# Patient Record
Sex: MALE | Race: WHITE | NOT HISPANIC OR LATINO | Employment: FULL TIME | URBAN - METROPOLITAN AREA
[De-identification: names, ages, dates, MRNs, and addresses within clinical notes are randomized per-mention and may not be internally consistent; named-entity substitution may affect disease eponyms.]

---

## 2022-09-23 ENCOUNTER — APPOINTMENT (OUTPATIENT)
Dept: RADIOLOGY | Facility: CLINIC | Age: 52
End: 2022-09-23
Payer: COMMERCIAL

## 2022-09-23 VITALS
DIASTOLIC BLOOD PRESSURE: 75 MMHG | RESPIRATION RATE: 19 BRPM | HEART RATE: 78 BPM | SYSTOLIC BLOOD PRESSURE: 128 MMHG | WEIGHT: 226 LBS | BODY MASS INDEX: 29 KG/M2 | HEIGHT: 74 IN | TEMPERATURE: 98.2 F

## 2022-09-23 DIAGNOSIS — M25.511 RIGHT SHOULDER PAIN, UNSPECIFIED CHRONICITY: ICD-10-CM

## 2022-09-23 DIAGNOSIS — M75.51 ACUTE BURSITIS OF RIGHT SHOULDER: Primary | ICD-10-CM

## 2022-09-23 PROCEDURE — 73030 X-RAY EXAM OF SHOULDER: CPT

## 2022-09-23 PROCEDURE — 99204 OFFICE O/P NEW MOD 45 MIN: CPT | Performed by: ORTHOPAEDIC SURGERY

## 2022-09-23 NOTE — PROGRESS NOTES
Assessment/Plan:  1  Acute bursitis of right shoulder  XR shoulder 2+ vw right    Ambulatory referral to Physical Therapy       Jamilah Maradiaga has right-sided shoulder pain consistent with subacromial impingement, rotator cuff tendinitis and bursitis in the right shoulder  He has appropriate strength on examination and appropriate range of motion  I do not think he has a large rotator cuff tear on examination today  I recommended activity modification and formal physical therapy as well as oral anti-inflammatory medications and ice as needed  We also briefly discussed possibility of a cortisone injection however we will hold off on that at this time  He will undergo physical therapy over the next 4-6 weeks and follow up in the office after therapy is complete  Subjective:   Carlo Anderson is a 46 y o  male who presents to the office for evaluation for right-sided shoulder pain  He denies any specific injury or trauma but he has been having increased discomfort in the right shoulder for the last 3-4 weeks  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  He has felt increased pain and discomfort over the anterior portion of the right shoulder  He denies anyone traumatic injury or feeling a pop in his shoulder  He did play golf recently and seem to exacerbate his pain a little bit further with golfing  He feels an increased aching throbbing pain over the front of the shoulder that worsens with lifting especially with his arm straight out in front  He denies any numbness or tingling radiating pain down his arm  Review of Systems   Constitutional: Negative for chills, fever and unexpected weight change  HENT: Negative for hearing loss, nosebleeds and sore throat  Eyes: Negative for pain, redness and visual disturbance  Respiratory: Negative for cough, shortness of breath and wheezing  Cardiovascular: Negative for chest pain, palpitations and leg swelling  Gastrointestinal: Negative for abdominal pain, nausea and vomiting  Endocrine: Negative for polyphagia and polyuria  Genitourinary: Negative for dysuria and hematuria  Musculoskeletal:        See HPI   Skin: Negative for rash and wound  Neurological: Negative for dizziness, numbness and headaches  Psychiatric/Behavioral: Negative for decreased concentration and suicidal ideas  The patient is not nervous/anxious  History reviewed  No pertinent past medical history  History reviewed  No pertinent surgical history  Family History   Problem Relation Age of Onset    No Known Problems Mother     No Known Problems Father        Social History     Occupational History    Not on file   Tobacco Use    Smoking status: Never Smoker    Smokeless tobacco: Never Used   Substance and Sexual Activity    Alcohol use: Not on file    Drug use: Not Currently    Sexual activity: Yes       No current outpatient medications on file  No Known Allergies    Objective:  Vitals:    09/23/22 1500   BP: 128/75   Pulse: 78   Resp: 19   Temp: 98 2 °F (36 8 °C)       Right Shoulder Exam     Tenderness   Right shoulder tenderness location: Subacromial space  Range of Motion   Active abduction: normal   Passive abduction: normal   Extension: normal   External rotation: normal   Forward flexion: normal   Internal rotation 0 degrees: normal     Muscle Strength   Abduction: 5/5   Internal rotation: 5/5   External rotation: 5/5   Supraspinatus: 5/5   Subscapularis: 5/5   Biceps: 5/5     Tests   Kelly test: positive  Impingement: positive  Drop arm: negative    Other   Erythema: absent  Sensation: normal  Pulse: present            Physical Exam  Vitals and nursing note reviewed  Constitutional:       Appearance: He is well-developed  HENT:      Head: Normocephalic and atraumatic  Eyes:      General: No scleral icterus  Extraocular Movements: Extraocular movements intact        Conjunctiva/sclera: Conjunctivae normal    Cardiovascular:      Rate and Rhythm: Normal rate  Pulmonary:      Effort: Pulmonary effort is normal  No respiratory distress  Musculoskeletal:      Cervical back: Normal range of motion and neck supple  Comments: As noted in HPI   Skin:     General: Skin is warm and dry  Neurological:      Mental Status: He is alert and oriented to person, place, and time  Psychiatric:         Behavior: Behavior normal          I have personally reviewed pertinent films in PACS and my interpretation is as follows:  X-rays of the right shoulder demonstrate no evidence of acute fracture or significant degenerative change    This document was created using speech voice recognition software  Grammatical errors, random word insertions, pronoun errors, and incomplete sentences are an occasional consequence of this system due to software limitations, ambient noise, and hardware issues  Any formal questions or concerns about content, text, or information contained within the body of this dictation should be directly addressed to the provider for clarification

## 2023-11-09 ENCOUNTER — OFFICE VISIT (OUTPATIENT)
Dept: URGENT CARE | Facility: CLINIC | Age: 53
End: 2023-11-09
Payer: COMMERCIAL

## 2023-11-09 VITALS
DIASTOLIC BLOOD PRESSURE: 80 MMHG | SYSTOLIC BLOOD PRESSURE: 120 MMHG | WEIGHT: 221 LBS | RESPIRATION RATE: 18 BRPM | HEART RATE: 64 BPM | BODY MASS INDEX: 28.36 KG/M2 | TEMPERATURE: 98 F | HEIGHT: 74 IN | OXYGEN SATURATION: 98 %

## 2023-11-09 DIAGNOSIS — N36.8 URETHRAL IRRITATION: ICD-10-CM

## 2023-11-09 DIAGNOSIS — N34.2 URETHRITIS: Primary | ICD-10-CM

## 2023-11-09 DIAGNOSIS — R39.9 UTI SYMPTOMS: ICD-10-CM

## 2023-11-09 LAB
SL AMB  POCT GLUCOSE, UA: ABNORMAL
SL AMB LEUKOCYTE ESTERASE,UA: ABNORMAL
SL AMB POCT BILIRUBIN,UA: ABNORMAL
SL AMB POCT BLOOD,UA: ABNORMAL
SL AMB POCT CLARITY,UA: CLEAR
SL AMB POCT COLOR,UA: YELLOW
SL AMB POCT KETONES,UA: ABNORMAL
SL AMB POCT NITRITE,UA: ABNORMAL
SL AMB POCT PH,UA: 5
SL AMB POCT SPECIFIC GRAVITY,UA: 1.01
SL AMB POCT URINE PROTEIN: ABNORMAL
SL AMB POCT UROBILINOGEN: 0.2

## 2023-11-09 PROCEDURE — 99213 OFFICE O/P EST LOW 20 MIN: CPT | Performed by: STUDENT IN AN ORGANIZED HEALTH CARE EDUCATION/TRAINING PROGRAM

## 2023-11-09 PROCEDURE — 87086 URINE CULTURE/COLONY COUNT: CPT | Performed by: STUDENT IN AN ORGANIZED HEALTH CARE EDUCATION/TRAINING PROGRAM

## 2023-11-09 PROCEDURE — 81002 URINALYSIS NONAUTO W/O SCOPE: CPT | Performed by: STUDENT IN AN ORGANIZED HEALTH CARE EDUCATION/TRAINING PROGRAM

## 2023-11-09 RX ORDER — VITAMIN B COMPLEX
1 CAPSULE ORAL DAILY
COMMUNITY

## 2023-11-09 RX ORDER — MULTIVITAMIN
1 TABLET ORAL DAILY
COMMUNITY

## 2023-11-09 NOTE — PROGRESS NOTES
[facility]        NAME: Sabas Martinez is a 48 y.o. male  : 1970    MRN: [id number]  DATE: 2023  TIME: 5:46 PM    Assessment and Orders   Urethritis [N34.2]  1. Urethritis  Urine culture      2. UTI symptoms  POCT urine dip    Urine culture      3. Urethral irritation  Ambulatory Referral to Urology    Urine culture            Plan and Discussion      Patient describing signs of urethritis. UA positive only for protein. Will follow up with urine culture. Referred patient to urology. Discussed ED precautions including (but not limited to)  Difficultly breathing or shortness of breath  Chest pain  Acutely worsening symptoms. Risks and benefits discussed. Patient understands and agrees with the plan. Follow up with PCP. Chief Complaint     Chief Complaint   Patient presents with    Possible UTI     Pt here for  urinary concerns pt states  itchy  inside his penis,  on going x  2 weeks. No meds used. History of Present Illness       States that prostate cancer and colon cancer runs in his family, he is concerned about his prostate. Low sex drive and difficultly with erectile difficulties. For two weeks has been having itching at the urethra. No lesions or bleeding. Having itching when he is urinating. States that he is having itching throughout the urethra. No lesions or skin changes. No testicular pain. Patient states that he has "sticky ejaculate" since having a vasectomy. No fevers. No blood in the urine or semen.            Review of Systems   Review of Systems  As stated above     Current Medications       Current Outpatient Medications:     b complex vitamins capsule, Take 1 capsule by mouth daily, Disp: , Rfl:     Multiple Vitamin (multivitamin) tablet, Take 1 tablet by mouth daily, Disp: , Rfl:     Current Allergies     Allergies as of 2023    (No Known Allergies)            The following portions of the patient's history were reviewed and updated as appropriate: allergies, current medications, past family history, past medical history, past social history, past surgical history and problem list.     Past Medical History:   Diagnosis Date    Patient denies medical problems     Tinnitus of both ears        Past Surgical History:   Procedure Laterality Date    NO PAST SURGERIES         Family History   Problem Relation Age of Onset    Diabetes Mother     Cancer Father     Heart disease Father          Medications have been verified. Objective   /80   Pulse 64   Temp 98 °F (36.7 °C)   Resp 18   Ht 6' 2" (1.88 m)   Wt 100 kg (221 lb)   SpO2 98%   BMI 28.37 kg/m²   No LMP for male patient. Physical Exam     Physical Exam  Constitutional:       General: He is not in acute distress. Appearance: He is not ill-appearing. HENT:      Head: Normocephalic and atraumatic. Right Ear: External ear normal.      Left Ear: External ear normal.      Nose: Nose normal.   Cardiovascular:      Rate and Rhythm: Normal rate and regular rhythm. Pulmonary:      Effort: Pulmonary effort is normal. No respiratory distress. Abdominal:      Tenderness: There is no abdominal tenderness. There is no right CVA tenderness or left CVA tenderness. Skin:     General: Skin is warm and dry. Neurological:      General: No focal deficit present. Mental Status: He is alert and oriented to person, place, and time. Psychiatric:         Mood and Affect: Mood normal.         Behavior: Behavior normal.         Thought Content:  Thought content normal.         Judgment: Judgment normal.               Arik Tillman DO

## 2023-11-10 LAB — BACTERIA UR CULT: NORMAL

## 2024-12-31 ENCOUNTER — OFFICE VISIT (OUTPATIENT)
Dept: OBGYN CLINIC | Facility: CLINIC | Age: 54
End: 2024-12-31
Payer: COMMERCIAL

## 2024-12-31 ENCOUNTER — OFFICE VISIT (OUTPATIENT)
Dept: LAB | Facility: HOSPITAL | Age: 54
End: 2024-12-31
Attending: ORTHOPAEDIC SURGERY
Payer: COMMERCIAL

## 2024-12-31 ENCOUNTER — APPOINTMENT (OUTPATIENT)
Dept: RADIOLOGY | Facility: CLINIC | Age: 54
End: 2024-12-31
Payer: COMMERCIAL

## 2024-12-31 VITALS — WEIGHT: 210 LBS | HEIGHT: 74 IN | BODY MASS INDEX: 26.95 KG/M2

## 2024-12-31 DIAGNOSIS — S82.892A CLOSED FRACTURE OF LEFT ANKLE, INITIAL ENCOUNTER: ICD-10-CM

## 2024-12-31 DIAGNOSIS — M25.572 LEFT ANKLE PAIN, UNSPECIFIED CHRONICITY: ICD-10-CM

## 2024-12-31 DIAGNOSIS — M25.572 LEFT ANKLE PAIN, UNSPECIFIED CHRONICITY: Primary | ICD-10-CM

## 2024-12-31 LAB
ANION GAP SERPL CALCULATED.3IONS-SCNC: 5 MMOL/L (ref 4–13)
BASOPHILS # BLD AUTO: 0.08 THOUSANDS/ΜL (ref 0–0.1)
BASOPHILS NFR BLD AUTO: 1 % (ref 0–1)
BUN SERPL-MCNC: 18 MG/DL (ref 5–25)
CALCIUM SERPL-MCNC: 9.5 MG/DL (ref 8.4–10.2)
CHLORIDE SERPL-SCNC: 105 MMOL/L (ref 96–108)
CO2 SERPL-SCNC: 29 MMOL/L (ref 21–32)
CREAT SERPL-MCNC: 0.97 MG/DL (ref 0.6–1.3)
EOSINOPHIL # BLD AUTO: 0.22 THOUSAND/ΜL (ref 0–0.61)
EOSINOPHIL NFR BLD AUTO: 2 % (ref 0–6)
ERYTHROCYTE [DISTWIDTH] IN BLOOD BY AUTOMATED COUNT: 12.1 % (ref 11.6–15.1)
GFR SERPL CREATININE-BSD FRML MDRD: 88 ML/MIN/1.73SQ M
GLUCOSE SERPL-MCNC: 92 MG/DL (ref 65–140)
HCT VFR BLD AUTO: 43.7 % (ref 36.5–49.3)
HGB BLD-MCNC: 14.9 G/DL (ref 12–17)
IMM GRANULOCYTES # BLD AUTO: 0.03 THOUSAND/UL (ref 0–0.2)
IMM GRANULOCYTES NFR BLD AUTO: 0 % (ref 0–2)
LYMPHOCYTES # BLD AUTO: 2.24 THOUSANDS/ΜL (ref 0.6–4.47)
LYMPHOCYTES NFR BLD AUTO: 22 % (ref 14–44)
MCH RBC QN AUTO: 31.4 PG (ref 26.8–34.3)
MCHC RBC AUTO-ENTMCNC: 34.1 G/DL (ref 31.4–37.4)
MCV RBC AUTO: 92 FL (ref 82–98)
MONOCYTES # BLD AUTO: 0.63 THOUSAND/ΜL (ref 0.17–1.22)
MONOCYTES NFR BLD AUTO: 6 % (ref 4–12)
NEUTROPHILS # BLD AUTO: 7.09 THOUSANDS/ΜL (ref 1.85–7.62)
NEUTS SEG NFR BLD AUTO: 69 % (ref 43–75)
NRBC BLD AUTO-RTO: 0 /100 WBCS
PLATELET # BLD AUTO: 273 THOUSANDS/UL (ref 149–390)
PMV BLD AUTO: 9.8 FL (ref 8.9–12.7)
POTASSIUM SERPL-SCNC: 4 MMOL/L (ref 3.5–5.3)
RBC # BLD AUTO: 4.74 MILLION/UL (ref 3.88–5.62)
SODIUM SERPL-SCNC: 139 MMOL/L (ref 135–147)
WBC # BLD AUTO: 10.29 THOUSAND/UL (ref 4.31–10.16)

## 2024-12-31 PROCEDURE — 36415 COLL VENOUS BLD VENIPUNCTURE: CPT

## 2024-12-31 PROCEDURE — 80048 BASIC METABOLIC PNL TOTAL CA: CPT

## 2024-12-31 PROCEDURE — 85025 COMPLETE CBC W/AUTO DIFF WBC: CPT

## 2024-12-31 PROCEDURE — 73610 X-RAY EXAM OF ANKLE: CPT

## 2024-12-31 PROCEDURE — 99204 OFFICE O/P NEW MOD 45 MIN: CPT | Performed by: ORTHOPAEDIC SURGERY

## 2024-12-31 PROCEDURE — 93005 ELECTROCARDIOGRAM TRACING: CPT

## 2024-12-31 RX ORDER — CHLORHEXIDINE GLUCONATE ORAL RINSE 1.2 MG/ML
15 SOLUTION DENTAL ONCE
Status: CANCELLED | OUTPATIENT
Start: 2024-12-31 | End: 2024-12-31

## 2024-12-31 NOTE — LETTER
December 31, 2024     Patient: Levi Jin  YOB: 1970  Date of Visit: 12/31/2024      To Whom it May Concern:    Levi Jin is under my professional care. Levi was seen in my office on 12/31/2024. Levi may return to work at this time on limited duty. He may drive. 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 No working on his feet of any kind.     If you have any questions or concerns, please don't hesitate to call.         Sincerely,          Sanjay Jackson MD

## 2024-12-31 NOTE — PROGRESS NOTES
Ortho Sports Medicine New Patient Visit     Assesment:   54 y.o. male with left lateral malleolus ankle fracture    Plan:    Patient has a Partida B lateral mall fracture on x-ray.  Stress x-rays were performed which which showed widening of the medial clear space.  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  For this reason I did recommend surgical intervention to include open reduction internal fixation of the lateral malleolus ankle fracture and possible syndesmotic fixation if there is persistent instability after fixation.  We had a detailed discussion of the risks, benefits, and alternatives to this procedure. The risks include but are not limited to infection, bleeding, stiffness, loss of range of motion, blood clot, failure of surgery, fracture, risk of potential future arthritis, swelling, injury to surrounding structures/nerve/artery/vein, failure of medical implants or surgical instruments, retained hardware and/or foreign body, and continued pain/dysfunction/disability. We discussed the expected timeline for recovery including the timeline for return to work and sporting activities.  We discussed a period of nonweightbearing for 4 weeks and transition to weightbearing after that.  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  Clearance for full duty will be between 8 and 12 weeks after surgery depending on his progress after beginning weightbearing.  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  The patient expressed good understanding and elected to proceed. They will meet be scheduled at a mutually convenient time in the near future.     Patient placed in a cam boot today.  I recommended nonweightbearing at this time.  He will not start physical therapy until after follow-up with me.  He will follow-up with me 10 to 14 days after surgery.        Follow up:    No follow-ups on  file.        Chief Complaint   Patient presents with    Left Ankle - Pain     Skiing incident.       History of Present Illness:    The patient is a 54 y.o. male presenting several days after an acute injury to his left ankle while skiing.  He states he was going at a low speed and his skis got twisted and he had a twisting injury to the ankle.  He did have acute pain at that time.  He has been able to ambulate though with pain.  For this reason he delayed getting evaluated for a few days.  Unfortunately the pain is not improving and continues to have a lot of pain with walking so he presented today for evaluation.  Pain is localized to the lateral aspect of the ankle.  There is no overlying skin wounds or lesions.  He denies any numbness or tingling.  He has had issues with the knees in the past but never with this ankle.          Past Medical, Social and Family History:  Past Medical History:   Diagnosis Date    Patient denies medical problems     Tinnitus of both ears      Past Surgical History:   Procedure Laterality Date    NO PAST SURGERIES       No Known Allergies  Current Outpatient Medications on File Prior to Visit   Medication Sig Dispense Refill    Multiple Vitamin (multivitamin) tablet Take 1 tablet by mouth daily      b complex vitamins capsule Take 1 capsule by mouth daily (Patient not taking: Reported on 12/31/2024)       No current facility-administered medications on file prior to visit.     Social History     Socioeconomic History    Marital status: /Civil Union     Spouse name: Not on file    Number of children: Not on file    Years of education: Not on file    Highest education level: Not on file   Occupational History    Not on file   Tobacco Use    Smoking status: Never    Smokeless tobacco: Never   Vaping Use    Vaping status: Never Used   Substance and Sexual Activity    Alcohol use: Yes    Drug use: Never    Sexual activity: Yes   Other Topics Concern    Not on file   Social History  "Narrative    Not on file     Social Drivers of Health     Financial Resource Strain: Not on file   Food Insecurity: Not on file   Transportation Needs: Not on file   Physical Activity: Not on file   Stress: Not on file   Social Connections: Not on file   Intimate Partner Violence: Not on file   Housing Stability: Not on file         I have reviewed the past medical, surgical, social and family history, medications and allergies as documented in the EMR.    Review of systems: ROS is negative other than that noted in the HPI.  Constitutional: Negative for fatigue and fever.   HENT: Negative for sore throat.    Respiratory: Negative for shortness of breath.    Cardiovascular: Negative for chest pain.   Gastrointestinal: Negative for abdominal pain.   Endocrine: Negative for cold intolerance and heat intolerance.   Genitourinary: Negative for flank pain.   Musculoskeletal: Negative for back pain.   Skin: Negative for rash.   Allergic/Immunologic: Negative for immunocompromised state.   Neurological: Negative for dizziness.   Psychiatric/Behavioral: Negative for agitation.      Physical Exam:    Height 6' 2\" (1.88 m), weight 95.3 kg (210 lb).    General/Constitutional: NAD, well developed, well nourished  HENT: Normocephalic, atraumatic  CV: Intact distal pulses, regular rate  Resp: No respiratory distress or labored breathing  Abdomen: soft, nondistended   Lymphatic: No lymphadenopathy palpated  Neuro: Alert and Oriented x 3, no focal deficits  Psych: Normal mood, normal affect  Skin: Warm, dry, no rashes, no erythema      Ankle Exam:   No significant skin lesions or deformity  Does have well-maintained dorsiflexion and plantarflexion though with pain.  Limited inversion eversion  Tender over the lateral malleolus no medial tenderness    Neurovascularly intact distally    Knee Imaging    X-rays left ankle including external rotation stress view shows distal fibula fracture with medial clear space widening on the stress " view

## 2025-01-02 ENCOUNTER — ANESTHESIA EVENT (OUTPATIENT)
Dept: PERIOP | Facility: HOSPITAL | Age: 55
End: 2025-01-02
Payer: COMMERCIAL

## 2025-01-02 LAB
ATRIAL RATE: 61 BPM
P AXIS: 63 DEGREES
PR INTERVAL: 164 MS
QRS AXIS: 48 DEGREES
QRSD INTERVAL: 102 MS
QT INTERVAL: 406 MS
QTC INTERVAL: 409 MS
T WAVE AXIS: 51 DEGREES
VENTRICULAR RATE: 61 BPM

## 2025-01-02 PROCEDURE — 93010 ELECTROCARDIOGRAM REPORT: CPT | Performed by: INTERNAL MEDICINE

## 2025-01-03 ENCOUNTER — APPOINTMENT (OUTPATIENT)
Dept: RADIOLOGY | Facility: HOSPITAL | Age: 55
End: 2025-01-03
Payer: COMMERCIAL

## 2025-01-03 ENCOUNTER — HOSPITAL ENCOUNTER (OUTPATIENT)
Facility: HOSPITAL | Age: 55
Setting detail: OUTPATIENT SURGERY
Discharge: HOME | End: 2025-01-03
Attending: ORTHOPAEDIC SURGERY | Admitting: ORTHOPAEDIC SURGERY
Payer: COMMERCIAL

## 2025-01-03 ENCOUNTER — ANESTHESIA (OUTPATIENT)
Dept: PERIOP | Facility: HOSPITAL | Age: 55
End: 2025-01-03
Payer: COMMERCIAL

## 2025-01-03 VITALS
DIASTOLIC BLOOD PRESSURE: 62 MMHG | RESPIRATION RATE: 18 BRPM | OXYGEN SATURATION: 98 % | HEIGHT: 73 IN | WEIGHT: 219.36 LBS | TEMPERATURE: 97 F | BODY MASS INDEX: 29.07 KG/M2 | SYSTOLIC BLOOD PRESSURE: 118 MMHG | HEART RATE: 63 BPM

## 2025-01-03 DIAGNOSIS — Z47.89 AFTERCARE FOLLOWING SURGERY OF THE MUSCULOSKELETAL SYSTEM: ICD-10-CM

## 2025-01-03 DIAGNOSIS — Z87.81 STATUS POST ORIF OF FRACTURE OF ANKLE: ICD-10-CM

## 2025-01-03 DIAGNOSIS — S82.892A CLOSED FRACTURE OF LEFT ANKLE, INITIAL ENCOUNTER: Primary | ICD-10-CM

## 2025-01-03 DIAGNOSIS — S82.892A FRACTURE OF MALLEOLUS, LEFT ANKLE, CLOSED, INITIAL ENCOUNTER: ICD-10-CM

## 2025-01-03 DIAGNOSIS — Z98.890 STATUS POST ORIF OF FRACTURE OF ANKLE: ICD-10-CM

## 2025-01-03 PROCEDURE — C1713 ANCHOR/SCREW BN/BN,TIS/BN: HCPCS | Performed by: ORTHOPAEDIC SURGERY

## 2025-01-03 PROCEDURE — 27792 TREATMENT OF ANKLE FRACTURE: CPT

## 2025-01-03 PROCEDURE — 73600 X-RAY EXAM OF ANKLE: CPT

## 2025-01-03 PROCEDURE — 27792 TREATMENT OF ANKLE FRACTURE: CPT | Performed by: ORTHOPAEDIC SURGERY

## 2025-01-03 PROCEDURE — 77071 MNL APPL STRS JT RADIOGRAPHY: CPT | Performed by: ORTHOPAEDIC SURGERY

## 2025-01-03 PROCEDURE — NC001 PR NO CHARGE: Performed by: ORTHOPAEDIC SURGERY

## 2025-01-03 DEVICE — IMPLANTABLE DEVICE: Type: IMPLANTABLE DEVICE | Site: ANKLE | Status: FUNCTIONAL

## 2025-01-03 DEVICE — SCREW VAL 3 X 16MM KREULOCK: Type: IMPLANTABLE DEVICE | Site: ANKLE | Status: FUNCTIONAL

## 2025-01-03 DEVICE — SCREW CORT 3.5 X 16MM LOPRFL FLLY THRD: Type: IMPLANTABLE DEVICE | Site: ANKLE | Status: FUNCTIONAL

## 2025-01-03 DEVICE — SCREW VAL 3 X 14MM KREULOCK: Type: IMPLANTABLE DEVICE | Site: ANKLE | Status: FUNCTIONAL

## 2025-01-03 DEVICE — SCREW CORT 3.5 X 14MM LOPRFL FLLY THRD: Type: IMPLANTABLE DEVICE | Site: ANKLE | Status: FUNCTIONAL

## 2025-01-03 RX ORDER — MIDAZOLAM HYDROCHLORIDE 2 MG/2ML
INJECTION, SOLUTION INTRAMUSCULAR; INTRAVENOUS AS NEEDED
Status: DISCONTINUED | OUTPATIENT
Start: 2025-01-03 | End: 2025-01-03

## 2025-01-03 RX ORDER — SODIUM CHLORIDE, SODIUM LACTATE, POTASSIUM CHLORIDE, CALCIUM CHLORIDE 600; 310; 30; 20 MG/100ML; MG/100ML; MG/100ML; MG/100ML
INJECTION, SOLUTION INTRAVENOUS CONTINUOUS PRN
Status: DISCONTINUED | OUTPATIENT
Start: 2025-01-03 | End: 2025-01-03

## 2025-01-03 RX ORDER — FENTANYL CITRATE 50 UG/ML
INJECTION, SOLUTION INTRAMUSCULAR; INTRAVENOUS AS NEEDED
Status: DISCONTINUED | OUTPATIENT
Start: 2025-01-03 | End: 2025-01-03

## 2025-01-03 RX ORDER — METOCLOPRAMIDE HYDROCHLORIDE 5 MG/ML
INJECTION INTRAMUSCULAR; INTRAVENOUS AS NEEDED
Status: DISCONTINUED | OUTPATIENT
Start: 2025-01-03 | End: 2025-01-03

## 2025-01-03 RX ORDER — ACETAMINOPHEN 500 MG
1000 TABLET ORAL EVERY 8 HOURS
Qty: 60 TABLET | Refills: 0 | Status: SHIPPED | OUTPATIENT
Start: 2025-01-03

## 2025-01-03 RX ORDER — FENTANYL CITRATE/PF 50 MCG/ML
25 SYRINGE (ML) INJECTION
Status: DISCONTINUED | OUTPATIENT
Start: 2025-01-03 | End: 2025-01-03 | Stop reason: HOSPADM

## 2025-01-03 RX ORDER — OXYCODONE HYDROCHLORIDE 5 MG/1
5 TABLET ORAL EVERY 4 HOURS PRN
Status: DISCONTINUED | OUTPATIENT
Start: 2025-01-03 | End: 2025-01-03 | Stop reason: HOSPADM

## 2025-01-03 RX ORDER — OXYCODONE HYDROCHLORIDE 5 MG/1
5 TABLET ORAL EVERY 6 HOURS PRN
Qty: 10 TABLET | Refills: 0 | Status: SHIPPED | OUTPATIENT
Start: 2025-01-03

## 2025-01-03 RX ORDER — ONDANSETRON 2 MG/ML
INJECTION INTRAMUSCULAR; INTRAVENOUS AS NEEDED
Status: DISCONTINUED | OUTPATIENT
Start: 2025-01-03 | End: 2025-01-03

## 2025-01-03 RX ORDER — CEFAZOLIN SODIUM 2 G/50ML
2000 SOLUTION INTRAVENOUS ONCE
Status: DISCONTINUED | OUTPATIENT
Start: 2025-01-03 | End: 2025-01-03 | Stop reason: HOSPADM

## 2025-01-03 RX ORDER — MAGNESIUM HYDROXIDE 1200 MG/15ML
LIQUID ORAL AS NEEDED
Status: DISCONTINUED | OUTPATIENT
Start: 2025-01-03 | End: 2025-01-03 | Stop reason: HOSPADM

## 2025-01-03 RX ORDER — CHLORHEXIDINE GLUCONATE ORAL RINSE 1.2 MG/ML
15 SOLUTION DENTAL ONCE
Status: COMPLETED | OUTPATIENT
Start: 2025-01-03 | End: 2025-01-03

## 2025-01-03 RX ORDER — DEXAMETHASONE SODIUM PHOSPHATE 10 MG/ML
INJECTION, SOLUTION INTRAMUSCULAR; INTRAVENOUS AS NEEDED
Status: DISCONTINUED | OUTPATIENT
Start: 2025-01-03 | End: 2025-01-03

## 2025-01-03 RX ORDER — NAPROXEN 500 MG/1
500 TABLET ORAL 2 TIMES DAILY WITH MEALS
Qty: 20 TABLET | Refills: 0 | Status: SHIPPED | OUTPATIENT
Start: 2025-01-03

## 2025-01-03 RX ORDER — PROPOFOL 10 MG/ML
INJECTION, EMULSION INTRAVENOUS AS NEEDED
Status: DISCONTINUED | OUTPATIENT
Start: 2025-01-03 | End: 2025-01-03

## 2025-01-03 RX ORDER — VANCOMYCIN HYDROCHLORIDE 1 G/20ML
INJECTION, POWDER, LYOPHILIZED, FOR SOLUTION INTRAVENOUS AS NEEDED
Status: DISCONTINUED | OUTPATIENT
Start: 2025-01-03 | End: 2025-01-03 | Stop reason: HOSPADM

## 2025-01-03 RX ORDER — LIDOCAINE HYDROCHLORIDE 10 MG/ML
INJECTION, SOLUTION EPIDURAL; INFILTRATION; INTRACAUDAL; PERINEURAL AS NEEDED
Status: DISCONTINUED | OUTPATIENT
Start: 2025-01-03 | End: 2025-01-03

## 2025-01-03 RX ORDER — CEFAZOLIN SODIUM 2 G/50ML
SOLUTION INTRAVENOUS AS NEEDED
Status: DISCONTINUED | OUTPATIENT
Start: 2025-01-03 | End: 2025-01-03

## 2025-01-03 RX ORDER — BUPIVACAINE HYDROCHLORIDE 2.5 MG/ML
INJECTION, SOLUTION EPIDURAL; INFILTRATION; INTRACAUDAL AS NEEDED
Status: DISCONTINUED | OUTPATIENT
Start: 2025-01-03 | End: 2025-01-03 | Stop reason: HOSPADM

## 2025-01-03 RX ORDER — KETOROLAC TROMETHAMINE 30 MG/ML
INJECTION, SOLUTION INTRAMUSCULAR; INTRAVENOUS AS NEEDED
Status: DISCONTINUED | OUTPATIENT
Start: 2025-01-03 | End: 2025-01-03

## 2025-01-03 RX ORDER — ACETAMINOPHEN 325 MG/1
650 TABLET ORAL EVERY 6 HOURS PRN
Status: CANCELLED | OUTPATIENT
Start: 2025-01-03

## 2025-01-03 RX ORDER — ONDANSETRON 2 MG/ML
4 INJECTION INTRAMUSCULAR; INTRAVENOUS ONCE AS NEEDED
Status: DISCONTINUED | OUTPATIENT
Start: 2025-01-03 | End: 2025-01-03 | Stop reason: HOSPADM

## 2025-01-03 RX ORDER — ONDANSETRON 2 MG/ML
4 INJECTION INTRAMUSCULAR; INTRAVENOUS EVERY 6 HOURS PRN
Status: CANCELLED | OUTPATIENT
Start: 2025-01-03

## 2025-01-03 RX ORDER — IBUPROFEN 200 MG
400 TABLET ORAL EVERY 6 HOURS PRN
COMMUNITY

## 2025-01-03 RX ORDER — ASPIRIN 81 MG/1
81 TABLET, CHEWABLE ORAL 2 TIMES DAILY
Qty: 84 TABLET | Refills: 0 | Status: SHIPPED | OUTPATIENT
Start: 2025-01-03

## 2025-01-03 RX ORDER — ROPIVACAINE HYDROCHLORIDE 5 MG/ML
INJECTION, SOLUTION EPIDURAL; INFILTRATION; PERINEURAL
Status: COMPLETED | OUTPATIENT
Start: 2025-01-03 | End: 2025-01-03

## 2025-01-03 RX ADMIN — DEXAMETHASONE SODIUM PHOSPHATE 10 MG: 10 INJECTION, SOLUTION INTRAMUSCULAR; INTRAVENOUS at 13:46

## 2025-01-03 RX ADMIN — CHLORHEXIDINE GLUCONATE 15 ML: 1.2 RINSE ORAL at 12:48

## 2025-01-03 RX ADMIN — SODIUM CHLORIDE, SODIUM LACTATE, POTASSIUM CHLORIDE, AND CALCIUM CHLORIDE: .6; .31; .03; .02 INJECTION, SOLUTION INTRAVENOUS at 13:37

## 2025-01-03 RX ADMIN — METOCLOPRAMIDE 10 MG: 5 INJECTION, SOLUTION INTRAMUSCULAR; INTRAVENOUS at 13:37

## 2025-01-03 RX ADMIN — ONDANSETRON 4 MG: 2 INJECTION INTRAMUSCULAR; INTRAVENOUS at 13:37

## 2025-01-03 RX ADMIN — FENTANYL CITRATE 50 MCG: 50 INJECTION, SOLUTION INTRAMUSCULAR; INTRAVENOUS at 13:41

## 2025-01-03 RX ADMIN — FENTANYL CITRATE 50 MCG: 50 INJECTION, SOLUTION INTRAMUSCULAR; INTRAVENOUS at 14:04

## 2025-01-03 RX ADMIN — ROPIVACAINE HYDROCHLORIDE 30 ML: 5 INJECTION, SOLUTION EPIDURAL; INFILTRATION; PERINEURAL at 13:33

## 2025-01-03 RX ADMIN — CEFAZOLIN SODIUM 2000 MG: 2 SOLUTION INTRAVENOUS at 13:38

## 2025-01-03 RX ADMIN — MIDAZOLAM 2 MG: 1 INJECTION INTRAMUSCULAR; INTRAVENOUS at 13:37

## 2025-01-03 RX ADMIN — FENTANYL CITRATE 50 MCG: 50 INJECTION, SOLUTION INTRAMUSCULAR; INTRAVENOUS at 14:50

## 2025-01-03 RX ADMIN — FENTANYL CITRATE 50 MCG: 50 INJECTION, SOLUTION INTRAMUSCULAR; INTRAVENOUS at 14:02

## 2025-01-03 RX ADMIN — KETOROLAC TROMETHAMINE 30 MG: 30 INJECTION, SOLUTION INTRAMUSCULAR at 14:48

## 2025-01-03 RX ADMIN — LIDOCAINE HYDROCHLORIDE 50 MG: 10 INJECTION, SOLUTION EPIDURAL; INFILTRATION; INTRACAUDAL; PERINEURAL at 13:41

## 2025-01-03 RX ADMIN — PROPOFOL 200 MG: 10 INJECTION, EMULSION INTRAVENOUS at 13:40

## 2025-01-03 NOTE — H&P
H&P reviewed. After examining the patient I find no changes in the patients condition since the H&P had been written.    Vitals:    01/03/25 1244   BP: 130/65   Pulse: 63   Resp: 18   SpO2: 98%         Ortho Sports Medicine New Patient Visit     Assesment:   54 y.o. male with left lateral malleolus ankle fracture    Plan:    Patient has a Partida B lateral mall fracture on x-ray.  Stress x-rays were performed which which showed widening of the medial clear space.  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  For this reason I did recommend surgical intervention to include open reduction internal fixation of the lateral malleolus ankle fracture and possible syndesmotic fixation if there is persistent instability after fixation.  We had a detailed discussion of the risks, benefits, and alternatives to this procedure. The risks include but are not limited to infection, bleeding, stiffness, loss of range of motion, blood clot, failure of surgery, fracture, risk of potential future arthritis, swelling, injury to surrounding structures/nerve/artery/vein, failure of medical implants or surgical instruments, retained hardware and/or foreign body, and continued pain/dysfunction/disability. We discussed the expected timeline for recovery including the timeline for return to work and sporting activities.  We discussed a period of nonweightbearing for 4 weeks and transition to weightbearing after that.  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  Clearance for full duty will be between 8 and 12 weeks after surgery depending on his progress after beginning weightbearing.  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  The patient expressed good understanding and elected to proceed. They will meet be scheduled at a mutually convenient time in the near future.     Patient placed in a cam boot today.  I  recommended nonweightbearing at this time.  He will not start physical therapy until after follow-up with me.  He will follow-up with me 10 to 14 days after surgery.        Follow up:    No follow-ups on file.        No chief complaint on file.      History of Present Illness:    The patient is a 54 y.o. male presenting several days after an acute injury to his left ankle while skiing.  He states he was going at a low speed and his skis got twisted and he had a twisting injury to the ankle.  He did have acute pain at that time.  He has been able to ambulate though with pain.  For this reason he delayed getting evaluated for a few days.  Unfortunately the pain is not improving and continues to have a lot of pain with walking so he presented today for evaluation.  Pain is localized to the lateral aspect of the ankle.  There is no overlying skin wounds or lesions.  He denies any numbness or tingling.  He has had issues with the knees in the past but never with this ankle.          Past Medical, Social and Family History:  Past Medical History:   Diagnosis Date    Patient denies medical problems     Tinnitus of both ears      Past Surgical History:   Procedure Laterality Date    NO PAST SURGERIES       No Known Allergies  No current facility-administered medications on file prior to encounter.     Current Outpatient Medications on File Prior to Encounter   Medication Sig Dispense Refill    ibuprofen (MOTRIN) 200 mg tablet Take 400 mg by mouth every 6 (six) hours as needed for mild pain      Multiple Vitamin (multivitamin) tablet Take 1 tablet by mouth daily      [DISCONTINUED] b complex vitamins capsule Take 1 capsule by mouth daily (Patient not taking: Reported on 12/31/2024)       Social History     Socioeconomic History    Marital status: /Civil Union     Spouse name: Not on file    Number of children: Not on file    Years of education: Not on file    Highest education level: Not on file   Occupational History  "   Not on file   Tobacco Use    Smoking status: Never    Smokeless tobacco: Never   Vaping Use    Vaping status: Never Used   Substance and Sexual Activity    Alcohol use: Yes    Drug use: Never    Sexual activity: Yes   Other Topics Concern    Not on file   Social History Narrative    Not on file     Social Drivers of Health     Financial Resource Strain: Not on file   Food Insecurity: Not on file   Transportation Needs: Not on file   Physical Activity: Not on file   Stress: Not on file   Social Connections: Not on file   Intimate Partner Violence: Not on file   Housing Stability: Not on file         I have reviewed the past medical, surgical, social and family history, medications and allergies as documented in the EMR.    Review of systems: ROS is negative other than that noted in the HPI.  Constitutional: Negative for fatigue and fever.   HENT: Negative for sore throat.    Respiratory: Negative for shortness of breath.    Cardiovascular: Negative for chest pain.   Gastrointestinal: Negative for abdominal pain.   Endocrine: Negative for cold intolerance and heat intolerance.   Genitourinary: Negative for flank pain.   Musculoskeletal: Negative for back pain.   Skin: Negative for rash.   Allergic/Immunologic: Negative for immunocompromised state.   Neurological: Negative for dizziness.   Psychiatric/Behavioral: Negative for agitation.      Physical Exam:    Blood pressure 130/65, pulse 63, resp. rate 18, height 6' 1\" (1.854 m), weight 99.5 kg (219 lb 5.7 oz), SpO2 98%.    General/Constitutional: NAD, well developed, well nourished  HENT: Normocephalic, atraumatic  CV: Intact distal pulses, regular rate  Resp: No respiratory distress or labored breathing  Abdomen: soft, nondistended   Lymphatic: No lymphadenopathy palpated  Neuro: Alert and Oriented x 3, no focal deficits  Psych: Normal mood, normal affect  Skin: Warm, dry, no rashes, no erythema      Ankle Exam:   No significant skin lesions or deformity  Does " have well-maintained dorsiflexion and plantarflexion though with pain.  Limited inversion eversion  Tender over the lateral malleolus no medial tenderness    Neurovascularly intact distally    Knee Imaging    X-rays left ankle including external rotation stress view shows distal fibula fracture with medial clear space widening on the stress view

## 2025-01-03 NOTE — OP NOTE
OPERATIVE REPORT  PATIENT NAME: Levi Jin    :  1970  MRN: [id number]  Pt Location: [facility] OR ROOM 01    SURGERY DATE: 1/3/2025    Surgeons and Role:     * Sanjay Jackson MD - Primary     * Phylicia Franco PA-C - Assisting    The presence of Phylicia Franco PA-C was required for poisitioning, retraction, assistance, and closure. No qualified resident was available.    Preop Diagnosis:  Closed fracture of left ankle, initial encounter [S82.892A]    Post-Op Diagnosis Codes:     * Closed fracture of left ankle, initial encounter [S82.892A]    Procedure(s):  Left - Left lateral maleolus open reduction internal fixation    Specimen(s):  * No specimens in log *    Estimated Blood Loss:   Minimal    Drains:  * No LDAs found *    Implants:   Implant Name Type Inv. Item Serial No.  Lot No. LRB No. Used Action   LOG [id number] - SYNTHES 2.7, AND 3.5MM LCP DISTAL FIBULA PLATES - 1           LOG [id number] - SYNTHES 4.0MM CANNULATED SCREW SET - 1           LOG [id number] - SYNTHES LCP SMALL FRAGMENT INSTRUMENT SET - 1           PLATE FIBULA DSTL LCK 6HL LT - [id number]  PLATE FIBULA DSTL LCK 6HL LT  [employer]  Left 1 Implanted   SCREW JALEEL 3 X 20MM LOPRFL - [id number]  SCREW JALEEL 3 X 20MM LOPRFL  [employer]  Left 1 Implanted   SCREW JALEEL 3 X 28MM LOPRFL - [id number]  SCREW JALEEL 3 X 28MM LOPRFL  [employer]  Left 1 Implanted   SCREW ALESSANDRA 3 X 14MM KREULOCK - [id number]  SCREW ALESSANDRA 3 X 14MM KREULOCK  [employer]  Left 2 Implanted   SCREW JALEEL 3.5 X 14MM LOPRFL FLLY THRD - [id number]  SCREW JALEEL 3.5 X 14MM LOPRFL FLLY THRD  [employer]  Left 2 Implanted   SCREW JALEEL 3.5 X 16MM LOPRFL FLLY THRD - [id number]  SCREW JALEEL 3.5 X 16MM LOPRFL FLLY THRD  [employer]  Left 1 Implanted   ANCHOR SUT MALU-BB THRD - [id number]  ANCHOR SUT MALU-BB THRD  [employer]  Left 0 Implanted and Explanted   SCREW ALESSANDRA 3 X 16MM KREULOCK - [id number]  SCREW ALESSANDRA 3 X 16MM KREULOCK  [employer]  Left 1 Implanted         Anesthesia Type:   General  w/ Regional    Operative Indications:  54-year-old very active male who sustained a skiing injury.  He was found to have a lateral malleolus fracture.  Stress x-rays showed instability with external rotation stress view.  Given his high level of activity and an unstable ankle fracture I did recommend surgical intervention to include open reduction internal fixation of left lateral malleolus as well as possible syndesmotic fixation.  We discussed the need for syndesmotic fixation with depend on stability with stress x-ray after lateral malleolus fixation was performed.  We had a detailed discussion of the risks, benefits, and alternatives to this procedure. The risks include but are not limited to infection, bleeding, stiffness, loss of range of motion, blood clot, failure of surgery, fracture, risk of potential future arthritis, swelling, injury to surrounding structures/nerve/artery/vein, failure of medical implants or surgical instruments, retained hardware and/or foreign body, and continued pain/dysfunction/disability. We discussed the expected timeline for recovery including the timeline for return to work and sporting activities. The patient expressed good understanding and elected to proceed.  Written consent was signed.     Findings:  Lateral malleolus ankle fracture that was unstable with external rotation stress.  After open reduction internal fixation was performed external rotation stress view was repeated under fluoroscopy or this showed no evidence of medial clear space widening.    Procedure:  In the pre-operative holding area, the patient identified the correct operative extremity and I marked that extremity with my initials. The patient was then brought to the operating room and positioned supine. Following satisfactory induction of anesthesia, the left ankle was prepped and draped in the usual sterile fashion. Before any surgical instrumentation was passed to me by the surgical technician, a  formalized time-out occurred, which involves the surgeon, circulating nurse, and anesthesia staff all verifying the correct operative extremity. My initials were visible on the prepped and draped operative field.      After final timeout was performed and the limb was exsanguinated using an Esmarch bandage and the tourniquet was inflated to 250 mmHg.  A standard lateral approach to the ankle was performed.  The fracture site was identified.  I open the fracture site and debrided early callus.  I then used a reduction forceps and manipulation of the ankle to reduce the fracture appropriately.  Reduction was confirmed under fluoroscopy.  I then placed a 3-0 lag screw across the fracture site.  The reduction clamp was removed and the fracture was found to be stable afterwards.  Appropriate position and reduction were confirmed using fluoroscopy again.  I then placed a lateral plate and proximal cortical nonlocking screws and a combination of distal locking and nonlocking screws.  After the plate was placed I again confirmed appropriate position of screws and plate on both AP and lateral views using fluoroscopy.  I then performed an external rotation stress view under fluoroscopy and found the ankle to be stable with no evidence of medial clear space widening.    Tourniquet was deflated.  Hemostasis was confirmed using electrocautery.  The wound was irrigated with normal saline.  I placed vancomycin powder onto the plate.  The wound was then closed in layered fashion using 2-0 Vicryl followed by 2-0 nylon in horizontal mattress configuration.  A sterile dressing and boot was placed.    The patient emerged from anesthesia.  The procedure was well-tolerated with no apparent complications.  He was taken to the recovery room in stable condition.        SIGNATURE: Sanjay Jackson MD  DATE: January 3, 2025  TIME: 4:39 PM

## 2025-01-03 NOTE — DISCHARGE INSTR - AVS FIRST PAGE
Postoperative Instructions Following Knee Surgery    MEDICATIONS:  Resume all home medications unless otherwise instructed by your surgeon.  Pain Medication:   Take Tylenol and Naproxen on prescribed schedule for 7 days  Take Oxycodone as needed for severe pain   If you were given a regional anesthetic (nerve block), it is helpful to take your pain medication before the block wear off.    Possible side effects include nausea, constipation, and urinary retention.  If you experience these side effects, please call our office for assistance.  Pain med refills are authorized only during office hours (8am-4pm, Mon-Fri).  Blood Clot Prevention:   Ambulate with your crutches at least once every hour   Take Aspirin 81 mg twice daily for 6 weeks following surgery.    WOUND CARE:  Keep the dressing clean and dry.  Light drainage may occur the first 2 days postop.  You may remove the Cam boot for hygiene. Keep the dressings clean and dry in the shoulder. You can shower 48 hours after surgery. Replace the boot after you shower to be worn otherwise at all times. DO NOT immerse the incision under water.  Carefully pat the incision dry.  If there is wound drainage, re-apply a fresh dry gauze dressing.  Please call our office ([phone]) if you experience either of the following:  Sudden increase in swelling, redness, or warmth at the surgical site  Excessive incisional drainage that persists beyond the 3rd day after surgery  Oral temperature greater than 101 degrees, not relieved with Tylenol  Shortness of breath, chest pain, nausea, or any other concerning symptoms    Other pain/swelling control measures:  Cold Therapy:  Apply ice (20 min on, 20 min off) as often as you feel is necessary. Ice helps with pain.   Elevation:  Elevate the entire leg above heart level.  Place pillows under your ankle to keep your knee straight. This will help with swelling and prevents stiffness     RANGE OF MOTION:  You are NOT ALLOWED RANGE OF  "MOTION until you are given permission from your surgeon.    IMMOBILIZATION:  Wear the Cam boot at all times, including sleep. The boot can be removed only for hygiene, then replace the boot after showering. Keep the dressings clean and dry at all times.    ACTIVITY:   DO NOT BEAR WEIGHT on the operative leg.  Always use crutches.  Using Crutches on Stairs:  Going up, lead with your \"good\" (nonoperative) leg.  Going down, lead with your \"bad\" (operative) leg.  Use a hand rail when available.  Knee Extension:  Place a rolled towel or pillow under your ankle for 20-30 minutes 3-5 times per day.  This will help to maintain full knee extension.  Quad Sets:  Sit or lie with your knee straight.  Tighten your quadriceps (front thigh) muscle.  Hold for 3 seconds, then relax.  Repeat 20 times per hour while awake.    PHYSICAL THERAPY:  To begin later in your recovery per discussion with Dr. Jackson as necessary.      FOLLOW-UP APPOINTMENT:  10-14 days with:    Dr. Don Jackson MD    [facility] [address], [address]  [place] [address]    [facility] [address] [address]    Phone:   [phone]   "

## 2025-01-03 NOTE — PERIOPERATIVE NURSING NOTE
Reviewed patient's chart with Roxana Richards RN pre-block. OR staff states they will verify chart in pre-procedure.

## 2025-01-03 NOTE — ANESTHESIA PROCEDURE NOTES
Peripheral Block    Patient location during procedure: holding area  Reason for block: at surgeon's request and post-op pain management  Staffing  Performed by: Jorge Roberts DO  Authorized by: Jorge Roberts DO    Preanesthetic Checklist  Completed: patient identified, IV checked, site marked, risks and benefits discussed, surgical consent, monitors and equipment checked, pre-op evaluation and timeout performed  Peripheral Block  Patient position: supine  Prep: ChloraPrep  Patient monitoring: continuous pulse oximetry, frequent blood pressure checks and heart rate  Block type: Popliteal  Laterality: left  Injection technique: single-shot  Procedures: ultrasound guided, Ultrasound guidance required for the procedure to increase accuracy and safety of medication placement and decrease risk of complications.  ropivacaine (NAROPIN) 0.5 % injection 20 mL - Perineural   30 mL - 1/3/2025 1:33:00 PM  Needle  Needle type: Stimuplex   Needle gauge: 20 G  Needle length: 4 in  Needle localization: anatomical landmarks and ultrasound guidance  Assessment  Injection assessment: frequent aspiration, injected with ease, negative aspiration, no paresthesia on injection, no symptoms of intraneural/intravenous injection, negative for heart rate change, needle tip visualized at all times and incremental injection  Paresthesia pain: none  Post-procedure:  site cleaned  patient tolerated the procedure well with no immediate complications

## 2025-01-03 NOTE — ANESTHESIA POSTPROCEDURE EVALUATION
Post-Op Assessment Note    CV Status:  Stable  Pain Score: 0    Pain management: adequate       Mental Status:  Sleepy   Hydration Status:  Stable   PONV Controlled:  None   Airway Patency:  Patent     Post Op Vitals Reviewed: Yes    No anethesia notable event occurred.    Staff: CRNA           Last Filed PACU Vitals:  Vitals Value Taken Time   Temp 97    Pulse 73    /73    Resp 13    SpO2 100 on FM O2

## 2025-01-03 NOTE — PROGRESS NOTES
Patient alert and awake, dressing clean, dry and intact, vital signs WNL. Patient transferred to APU via stretcher. Bedside report given to STEVE Hernandez. Side rails up, call bell within reach.

## 2025-01-03 NOTE — PROGRESS NOTES
Reviewed discharge instructions with patient and family at bedside. Answered questions appropriately. Patient dressed with assistance and was transported downstairs via wheelchair.

## 2025-01-03 NOTE — PROGRESS NOTES
Received patient from PACU RN. Patient awake and alert sitting upright on stretcher. Vitals WNL, denies any pain at this time. Refreshments provided, family updated and at bedside. Stretcher low and locked with bilateral side rails up. Call bell within reach, will continue to monitor. Mary Walker

## 2025-01-04 NOTE — ANESTHESIA POSTPROCEDURE EVALUATION
Post-Op Assessment Note    CV Status:  Stable  Pain Score: 0    Pain management: adequate    Multimodal analgesia used between 6 hours prior to anesthesia start to PACU discharge    Mental Status:  Alert   Hydration Status:  Stable   PONV Controlled:  None   Airway Patency:  Patent     Post Op Vitals Reviewed: Yes    No anethesia notable event occurred.    Staff: Anesthesiologist           Last Filed PACU Vitals:  Vitals Value Taken Time   Temp 97 °F (36.1 °C) 01/03/25 1518   Pulse 66 01/03/25 1545   /74 01/03/25 1545   Resp 18 01/03/25 1545   SpO2 98 % 01/03/25 1545       Modified Lucia:     Vitals Value Taken Time   Activity 2 01/03/25 1530   Respiration 2 01/03/25 1530   Circulation 2 01/03/25 1530   Consciousness 2 01/03/25 1530   Oxygen Saturation 2 01/03/25 1530     Modified Lucia Score: 10

## 2025-01-06 ENCOUNTER — TELEPHONE (OUTPATIENT)
Age: 55
End: 2025-01-06

## 2025-01-06 NOTE — TELEPHONE ENCOUNTER
Caller: Patient     Doctor: Manuel    Reason for call: Patient stated right before his surgery on 1/3/25 he handed Dr. Jackson his [place] ppwk. He is calling to confirm the ppwk was received. I advised the patient to also upload the ppwk into his chart and gave him instructions.     Call back#: [phone]

## 2025-01-07 NOTE — TELEPHONE ENCOUNTER
Completed and s/w pt. He will come to the [facility] office today before 2pm to  copy.    Please print for patient.

## 2025-01-08 NOTE — TELEPHONE ENCOUNTER
Patient asking if [place] paperwork can be placed in MyChart.  Asking for call back when paperwork is placed.

## 2025-01-14 ENCOUNTER — OFFICE VISIT (OUTPATIENT)
Dept: OBGYN CLINIC | Facility: CLINIC | Age: 55
End: 2025-01-14

## 2025-01-14 ENCOUNTER — APPOINTMENT (OUTPATIENT)
Dept: RADIOLOGY | Facility: CLINIC | Age: 55
End: 2025-01-14
Payer: COMMERCIAL

## 2025-01-14 VITALS — WEIGHT: 219.36 LBS | BODY MASS INDEX: 29.07 KG/M2 | HEIGHT: 73 IN

## 2025-01-14 DIAGNOSIS — M25.572 LEFT ANKLE PAIN, UNSPECIFIED CHRONICITY: Primary | ICD-10-CM

## 2025-01-14 DIAGNOSIS — S82.892A CLOSED FRACTURE OF LEFT ANKLE, INITIAL ENCOUNTER: ICD-10-CM

## 2025-01-14 DIAGNOSIS — M25.572 LEFT ANKLE PAIN, UNSPECIFIED CHRONICITY: ICD-10-CM

## 2025-01-14 PROCEDURE — 73610 X-RAY EXAM OF ANKLE: CPT

## 2025-01-14 PROCEDURE — 99024 POSTOP FOLLOW-UP VISIT: CPT | Performed by: ORTHOPAEDIC SURGERY

## 2025-01-14 NOTE — PROGRESS NOTES
SUBJECTIVE:  Patient is a 54 y.o. year old male who presents for follow up now POD 11 s/p Left lateral maleolus open reduction internal fixation - Left performed on  1/3/2025.  Today, the patient notes he has been doing very well and notes only minimal discomfort in the ankle rated a 1/10. The patient has been compliant with wearing the Cam boot at all times, including sleep, and remaining non-weight bearing with crutches. The patient has been using Tylenol and Advil for pain and taking ASA 81 mg twice daily for blood clot prevention. Levi denies new injury/trauma or numbness/tingling.    PHYSICAL EXAMINATION:  General: well developed and well nourished, alert, oriented times 3, and appears comfortable  Psychiatric: Normal    MUSCULOSKELETAL EXAMINATION:    Left Lower Extremity   Incision: Clean, dry, and intact. Minimal clear drainage from a distal aspect of the incision without any erythema, tenderness, or warmth. Sutures removed and Steri strips replaced. ACE wrap applied  Motor intact to EHL/FHL. Able to gentle perform ankle DF/PF  +EHL/FHL/TA/GS  SILT throughout  Palpable DP pulse     Imaging  I reviewed and interpreted the left ankle x-rays today, which demonstrate appropriate positioning of hardware without any evidence of complications. Well-aligned mortise.       PLAN:    We reviewed the procedure in detail. I believe the patient is progressing appropriately and I am pleased with his clinical presentation today. We also reviewed his x-rays today, which do show appropriate positioning of hardware without complications. He will monitor for any additional drainage or signs of infection, which are not present today. Levi will remain non-weight bearing with crutches until 4 weeks post-op then progress to 50% weight bearing for another 2 weeks before gradually progressing to WBAT at 6 weeks post-op. I recommend he continue to wear the Cam boot for sleep for another 2 weeks. He can begin to remove the boot  while at rest to work on gentle ankle ROM as tolerated. Levi can resume normal hygiene, allowing water to run over the incision. He can reapply the ACE/boot afterwards. Steri strips can be removed in 5-7 days if still in place. Avoid submersion of the incision for 1 month after surgery. Continue using Tylenol/NSAIDs as needed for pain control. Take ASA 81 mg BID x6 weeks total following surgery for DVT prophylaxis. We will follow up with Levi in 4 weeks with repeat x-rays upon arrival.

## 2025-02-10 ENCOUNTER — OFFICE VISIT (OUTPATIENT)
Dept: OBGYN CLINIC | Facility: CLINIC | Age: 55
End: 2025-02-10

## 2025-02-10 ENCOUNTER — APPOINTMENT (OUTPATIENT)
Dept: RADIOLOGY | Facility: CLINIC | Age: 55
End: 2025-02-10
Payer: COMMERCIAL

## 2025-02-10 VITALS — BODY MASS INDEX: 28.49 KG/M2 | WEIGHT: 215 LBS | HEIGHT: 73 IN

## 2025-02-10 DIAGNOSIS — Z87.81 S/P ORIF (OPEN REDUCTION INTERNAL FIXATION) FRACTURE: ICD-10-CM

## 2025-02-10 DIAGNOSIS — Z98.890 S/P ORIF (OPEN REDUCTION INTERNAL FIXATION) FRACTURE: ICD-10-CM

## 2025-02-10 DIAGNOSIS — S82.892D CLOSED FRACTURE OF LEFT ANKLE WITH ROUTINE HEALING, SUBSEQUENT ENCOUNTER: Primary | ICD-10-CM

## 2025-02-10 DIAGNOSIS — S82.892A CLOSED FRACTURE OF LEFT ANKLE, INITIAL ENCOUNTER: ICD-10-CM

## 2025-02-10 DIAGNOSIS — Z48.89 AFTERCARE FOLLOWING SURGERY: ICD-10-CM

## 2025-02-10 PROCEDURE — 73610 X-RAY EXAM OF ANKLE: CPT

## 2025-02-10 PROCEDURE — 99024 POSTOP FOLLOW-UP VISIT: CPT | Performed by: ORTHOPAEDIC SURGERY

## 2025-02-10 NOTE — PROGRESS NOTES
SUBJECTIVE:  Patient is a 54 y.o. year old male who presents for follow up now 5 weeks s/p  Left lateral maleolus open reduction internal fixation - Left performed on  1/3/2025.  Today patient denies pain but notes discomfort to the lateral ankle. He has numbness to his lateral foot and toes. He has been partial weight bearing with CAM boot and crutch.        VITALS:  There were no vitals filed for this visit.    PHYSICAL EXAMINATION:  General: well developed and well nourished, alert, oriented times 3, and appears comfortable  Psychiatric: Normal    MUSCULOSKELETAL EXAMINATION:    Left Lower Extremity   Incision:  Clean, dry, and intact no signs of infection.   Minimal tenderness at the fracture site  Range of Motion: Can dorsiflex to 10 degrees, plantar flexes to 30 with no pain on the direction  Strength: not tested  +EHL/FHL/TA/GS  SILT throughout  Palpable DP pulse       Imaging  I reviewed and interpreted the left ankle x-rays today, which demonstrate appropriate positioning of hardware without any evidence of complications. Well-aligned mortise.     PLAN:    5 weeks S/P Left lateral maleolus open reduction internal fixation - Left performed on 1/3/2025. I am pleased with his clinical exam and review of imaging today. Patient can progress to WBAT.  Once he is bearing weight without significant pain in the cam boot, he can wean out of CAM walker boot over the next 1-2 weeks. Referral to physical therapy provided to normalize motion and progress back to proprioception and strengthening exercise as tolerated.    Follow up in 6 weeks. Repeat left ankle x-ray on arrival.      Scribe Attestation      I,:  Doris Vital am acting as a scribe while in the presence of the attending physician.:       I,:  Sanjay Jackson MD personally performed the services described in this documentation    as scribed in my presence.:

## 2025-02-19 ENCOUNTER — TELEPHONE (OUTPATIENT)
Age: 55
End: 2025-02-19

## 2025-02-19 ENCOUNTER — EVALUATION (OUTPATIENT)
Dept: PHYSICAL THERAPY | Facility: CLINIC | Age: 55
End: 2025-02-19
Payer: COMMERCIAL

## 2025-02-19 DIAGNOSIS — Z98.890 S/P ORIF (OPEN REDUCTION INTERNAL FIXATION) FRACTURE: ICD-10-CM

## 2025-02-19 DIAGNOSIS — Z87.81 S/P ORIF (OPEN REDUCTION INTERNAL FIXATION) FRACTURE: ICD-10-CM

## 2025-02-19 DIAGNOSIS — S82.892D CLOSED FRACTURE OF LEFT ANKLE WITH ROUTINE HEALING, SUBSEQUENT ENCOUNTER: ICD-10-CM

## 2025-02-19 DIAGNOSIS — Z48.89 AFTERCARE FOLLOWING SURGERY: ICD-10-CM

## 2025-02-19 PROCEDURE — 97162 PT EVAL MOD COMPLEX 30 MIN: CPT | Performed by: PHYSICAL THERAPIST

## 2025-02-19 PROCEDURE — 97110 THERAPEUTIC EXERCISES: CPT | Performed by: PHYSICAL THERAPIST

## 2025-02-19 NOTE — TELEPHONE ENCOUNTER
Caller: Patient    Doctor: Dr. Jackson    Reason for call: Patient called to schedule PT. Provided PT phone number.    Call back#: [phone]

## 2025-02-20 NOTE — PROGRESS NOTES
PT Evaluation     Today's date: 2025  Patient name: Levi Jin  : 1970  MRN: [id number]  Referring provider: Sanjay Jackson*  Dx:   Encounter Diagnosis     ICD-10-CM    1. Closed fracture of left ankle with routine healing, subsequent encounter  S82.892D Ambulatory Referral to Physical Therapy      2. S/P ORIF (open reduction internal fixation) fracture  Z98.890 Ambulatory Referral to Physical Therapy    Z87.81       3. Aftercare following surgery  Z48.89 Ambulatory Referral to Physical Therapy                     Assessment  Impairments: abnormal gait, abnormal or restricted ROM, abnormal movement, activity intolerance, impaired balance, impaired physical strength, lacks appropriate home exercise program, pain with function, weight-bearing intolerance, poor body mechanics and unable to perform ADL    Assessment details: Levi Jin is a 54 y.o. male  who presents status post L ankle ORIF of 6.5 weeks with the associated impairments including: pain, decreased strength, decreased ROM, decreased joint mobility, joint effusion, and ambulatory dysfunction.  He demonstrates significant limitations with dorsiflexion ROM along with plantar flexion ROM.  He demonstrates global L ankle strength deficits likely due in part to decreased ROM.  Functional recovery may be influenced by prolonged history of knee issues that contribute to difficulty with closed chain movements, though strength is good in those muscle groups.  He demonstrates good understanding of gait training to facilitate reduced compensation and efficient functional recovery without increasing pain.  He additionally has the following functional limitations including: restricted ADL's, prolonged standing, prolonged sitting, transfers, squatting, functional mobility, recreational activities, work-related activities, lifting/carrying, inability to golf, inability to work, and inability to skii. Patient's signs and symptoms are consistent  with that of the referring diagnosis.  Provided education regarding healing timelines and plan of care with emphasis on gradual reloading and weaning off CAM boot.      Patient would likley benefit from skilled physical therapy services to address their aforementioned functional limitations through a targeted program consisting of repeated ROM/flexibility exercises, graded strengthening to global ankle/LE muscles, static/dynamic balance exercises, and graded increase in functional activity training in order to progress towards prior level of function and independence with home exercise program.         Understanding of Dx/Px/POC: good     Prognosis: good    Goals  STG: to be achieved within 2-4 weeks  1. Pt will be able to ambulate community distances with normal heel to toe pattern.  2. Improve strength so that pt will be be able to perform sit to stand transfers without UE support.  3. Patient will be able to ascend steps without pain or limitation.  4. Pt will be I with HEP    LTG:  to be achieved within 4-8 weeks  1. Pt will be able to negotiate stairs reciprocally without hand rail assistance.  2. Improve SLS balance with EO of greater than 30s for facilitation of return to skiing.  3. Pt will be able to ambulate without time restrictions pain free.  4. Pt will return to squatting and lifting 40 pounds without pain or restriction.  5. Pt will return to skiing without limitation (6-8 months)      Plan  Patient would benefit from: PT eval and skilled physical therapy  Planned modality interventions: cryotherapy, TENS, thermotherapy: hydrocollator packs, electrical stimulation/Icelandic stimulation and manual electrical stimulation    Planned therapy interventions: manual therapy, neuromuscular re-education, self care, therapeutic activities, therapeutic exercise, home exercise program, joint mobilization, balance, gait training, flexibility, strengthening, stretching, patient education, ADL training, activity  modification, body mechanics training, group therapy, graded exercise, graded activity, functional ROM exercises and therapeutic training    Frequency: 2x week  Duration in weeks: 8  Treatment plan discussed with: patient  Plan details: HEP development, stretching, strengthening, A/AA/PROM, joint mobilizations, posture education, STM/MI as needed to reduce muscle tension, muscle reeducation, PLOC discussed and agreed upon with patient.    Subjective Evaluation    History of Present Illness  Date of surgery: 1/3/2025  Mechanism of injury: surgery  Mechanism of injury: Levi Jin is a 54 y.o. male who presents status post L ankle ORIF of 6.5 weeks after a skiing accident.  He reports he had been using a scooter to get around predominantly, but has gotten rid of that and has been WB in the CAM boot.  He reports numbness and the associated discomfort/tightness is his greatest pain/issue at this time.  He reports that prolonged positions in any mild stretch bring on these numbness symptoms of the lateral portion of the foot.  If he supports the foot, these symptoms go away.  He is typically very active including skiing across the country with his children.  He works as an  and with heavy construction and he is out of work at this time as there is only full duty.  He reports stairs are also an issue both due to his ankle and knee as he has dislocated his patella frequently in the past on both sides.          Not a recurrent problem   Quality of life: good    Patient Goals  Patient goals for therapy: increased strength, decreased pain, independence with ADLs/IADLs, return to sport/leisure activities, increased motion, improved balance and return to work  Patient goal: Return to skiing, golf, and all work activities without limitation.  Pain  Current pain ratin  At best pain ratin  At worst pain ratin  Quality: discomfort, tight, radiating and dull ache  Relieving factors: rest  Aggravating  factors: walking and stair climbing    Social Support  Steps to enter house: yes  Stairs in house: yes   Lives in: multiple-level home  Lives with: spouse    Employment status: working  Hand dominance: right  Exercise history: Skiing and heavy occupational work      Objective     Active Range of Motion   Left Ankle/Foot   Dorsiflexion (ke): 0 degrees   Plantar flexion: 25 degrees with pain  Inversion: 20 degrees with pain  Eversion: 8 degrees     Right Ankle/Foot   Dorsiflexion (ke): 20 degrees   Plantar flexion: 65 degrees   Inversion: 45 degrees   Eversion: 10 degrees     Strength/Myotome Testing     Left Ankle/Foot   Dorsiflexion: 3+  Plantar flexion: 3  Inversion: 3+  Eversion: 3+    Right Ankle/Foot   Dorsiflexion: 4+  Plantar flexion: 4+  Inversion: 4+  Eversion: 4+    Additional Strength Details  Ambulation: Decreased stance time on L LE with decreased dorsiflexion in stance phase and short step length.    Squat: Unable    Step Ups/Downs: Unable     SLS: Deferred due to healing timelines.           Precautions:   Past Medical History:   Diagnosis Date   • Patient denies medical problems    • Tinnitus of both ears         Insurance:  A/CMS Eval/ Re-eval Auth #/ Referral # Total units or visits Start date  Expiration date KX? Visit limitation?  PT only or  PT+OT? Co-Insurance   CMS 2/19/25 2/19/25                      POC Start Date POC Expiration Date Signed POC?   2/19/25 4/18/25       Date 2/19              Visits/Units:  Used 1              Authed:  Remaining                  [employer]:  Access Code: [email or web address]  URL: [email or web address]  Date: 02/19/2025  Prepared by: Garrett Lema    Exercises  - Long Sitting Ankle Eversion with Resistance  - 2 x daily - 7 x weekly - 3 sets - 10 reps  - Long Sitting Ankle Plantar Flexion with Resistance  - 2 x daily - 7 x weekly - 3 sets - 10 reps  - Long Sitting Ankle Inversion with Resistance  - 2 x daily - 7 x weekly - 3 sets - 10 reps  - Long Sitting  Ankle Dorsiflexion with Anchored Resistance  - 2 x daily - 7 x weekly - 3 sets - 10 reps  - Long Sitting Calf Stretch with Strap  - 2 x daily - 7 x weekly - 1 sets - 10 reps - 10 hold      Manuals   2/19   L Ankle PROM                        Neuro Re-Ed      Ankle 4-way   HEP                                       Ther Ex      Long Sit Strap Str   HEP   Assessment & Management   POC with emphasis on early ROM with gradual strengthening and stabilization training.                                       Ther Activity                  Gait Training                  Modalities

## 2025-02-24 ENCOUNTER — OFFICE VISIT (OUTPATIENT)
Dept: PHYSICAL THERAPY | Facility: CLINIC | Age: 55
End: 2025-02-24
Payer: COMMERCIAL

## 2025-02-24 DIAGNOSIS — Z98.890 S/P ORIF (OPEN REDUCTION INTERNAL FIXATION) FRACTURE: ICD-10-CM

## 2025-02-24 DIAGNOSIS — Z48.89 AFTERCARE FOLLOWING SURGERY: ICD-10-CM

## 2025-02-24 DIAGNOSIS — Z87.81 S/P ORIF (OPEN REDUCTION INTERNAL FIXATION) FRACTURE: ICD-10-CM

## 2025-02-24 DIAGNOSIS — S82.892D CLOSED FRACTURE OF LEFT ANKLE WITH ROUTINE HEALING, SUBSEQUENT ENCOUNTER: Primary | ICD-10-CM

## 2025-02-24 PROCEDURE — 97140 MANUAL THERAPY 1/> REGIONS: CPT | Performed by: PHYSICAL THERAPIST

## 2025-02-24 PROCEDURE — 97110 THERAPEUTIC EXERCISES: CPT | Performed by: PHYSICAL THERAPIST

## 2025-02-24 PROCEDURE — 97112 NEUROMUSCULAR REEDUCATION: CPT | Performed by: PHYSICAL THERAPIST

## 2025-02-24 NOTE — PROGRESS NOTES
Daily Note     Today's date: 2025  Patient name: Levi Jin  : 1970  MRN: [id number]  Referring provider: Sanjay Jackson*  Dx:   Encounter Diagnosis     ICD-10-CM    1. Closed fracture of left ankle with routine healing, subsequent encounter  S82.892D       2. S/P ORIF (open reduction internal fixation) fracture  Z98.890     Z87.81       3. Aftercare following surgery  Z48.89                      Subjective: Patient reports he feels better overall, he has been very intentional about walking.        Objective: See treatment diary below      Assessment: Tolerated treatment well. Patient demonstrates progress with dorsiflexion ROM which has improved gait.  Continued to emphasize heel toe walk with avoidance of out-toeing of the foot.  Patient demonstrated fatigue post treatment, exhibited good technique with therapeutic exercises, and would benefit from continued PT      Plan: Continue per plan of care.  Progress treatment as tolerated.  Progress challenge as appropriate with irritability.     Precautions:   Past Medical History:   Diagnosis Date    Patient denies medical problems     Tinnitus of both ears         Insurance:  [place]/CMS Eval/ Re-eval Auth #/ Referral # Total units or visits Start date  Expiration date KX? Visit limitation?  PT only or  PT+OT? Co-Insurance   CMS 25                      POC Start Date POC Expiration Date Signed POC?   25       Date              Visits/Units:  Used 1 2             Authed:  Remaining                  [employer]:  Access Code: [email or web address]  URL: [email or web address]  Date: 2025  Prepared by: Garrett Lema    Exercises  - Long Sitting Ankle Eversion with Resistance  - 2 x daily - 7 x weekly - 3 sets - 10 reps  - Long Sitting Ankle Plantar Flexion with Resistance  - 2 x daily - 7 x weekly - 3 sets - 10 reps  - Long Sitting Ankle Inversion with Resistance  - 2 x daily - 7 x weekly - 3 sets - 10 reps  -  "Long Sitting Ankle Dorsiflexion with Anchored Resistance  - 2 x daily - 7 x weekly - 3 sets - 10 reps  - Long Sitting Calf Stretch with Strap  - 2 x daily - 7 x weekly - 1 sets - 10 reps - 10 hold      Manuals  2/24 2/19   L Ankle PROM  BR performed                      Neuro Re-Ed      Ankle 4-way   HEP   Heel Raises  2x10 B/L    SLS  Airex 10x10\"                            Ther Ex      Long Sit Strap Str   HEP   Assessment & Management   POC with emphasis on early ROM with gradual strengthening and stabilization training.   Gastroc Str  10x10\" standing    Soleus Str  10x10\" standing      Leg Press   Calf Press 2x10 55# into stretch    Step Downs  10x                Ther Activity                  Gait Training                    Modalities                       "

## 2025-02-27 ENCOUNTER — OFFICE VISIT (OUTPATIENT)
Dept: PHYSICAL THERAPY | Facility: CLINIC | Age: 55
End: 2025-02-27
Payer: COMMERCIAL

## 2025-02-27 DIAGNOSIS — S82.892D CLOSED FRACTURE OF LEFT ANKLE WITH ROUTINE HEALING, SUBSEQUENT ENCOUNTER: Primary | ICD-10-CM

## 2025-02-27 DIAGNOSIS — Z87.81 S/P ORIF (OPEN REDUCTION INTERNAL FIXATION) FRACTURE: ICD-10-CM

## 2025-02-27 DIAGNOSIS — Z48.89 AFTERCARE FOLLOWING SURGERY: ICD-10-CM

## 2025-02-27 DIAGNOSIS — Z98.890 S/P ORIF (OPEN REDUCTION INTERNAL FIXATION) FRACTURE: ICD-10-CM

## 2025-02-27 PROCEDURE — 97110 THERAPEUTIC EXERCISES: CPT | Performed by: PHYSICAL THERAPIST

## 2025-02-27 PROCEDURE — 97140 MANUAL THERAPY 1/> REGIONS: CPT | Performed by: PHYSICAL THERAPIST

## 2025-02-27 PROCEDURE — 97112 NEUROMUSCULAR REEDUCATION: CPT | Performed by: PHYSICAL THERAPIST

## 2025-02-27 NOTE — PROGRESS NOTES
Daily Note     Today's date: 2025  Patient name: Levi Jin  : 1970  MRN: [id number]  Referring provider: Sanjay Jackson*  Dx:   Encounter Diagnosis     ICD-10-CM    1. Closed fracture of left ankle with routine healing, subsequent encounter  S82.892D       2. S/P ORIF (open reduction internal fixation) fracture  Z98.890     Z87.81       3. Aftercare following surgery  Z48.89                      Subjective: Patient reports he feels about the same overall.  His walking is better though his balance has continued to be difficult.      Objective: See treatment diary below      Assessment: Tolerated treatment well. Patient continues to progress with dorsiflexion ROM.  He demonstrates continued single limb balance deficits.  Patient demonstrated fatigue post treatment, exhibited good technique with therapeutic exercises, and would benefit from continued PT      Plan: Continue per plan of care.  Progress treatment as tolerated.  Progress challenge as appropriate with irritability.     Precautions:   Past Medical History:   Diagnosis Date    Patient denies medical problems     Tinnitus of both ears         Insurance:  [place]/CMS Eval/ Re-eval Auth #/ Referral # Total units or visits Start date  Expiration date KX? Visit limitation?  PT only or  PT+OT? Co-Insurance   CMS 25                      POC Start Date POC Expiration Date Signed POC?   25       Date             Visits/Units:  Used 1 2 3            Authed:  Remaining                  [employer]:  Access Code: [email or web address]  URL: [email or web address]  Date: 2025  Prepared by: Garrett Lema    Exercises  - Long Sitting Ankle Eversion with Resistance  - 2 x daily - 7 x weekly - 3 sets - 10 reps  - Long Sitting Ankle Plantar Flexion with Resistance  - 2 x daily - 7 x weekly - 3 sets - 10 reps  - Long Sitting Ankle Inversion with Resistance  - 2 x daily - 7 x weekly - 3 sets - 10 reps  - Long  "Sitting Ankle Dorsiflexion with Anchored Resistance  - 2 x daily - 7 x weekly - 3 sets - 10 reps  - Long Sitting Calf Stretch with Strap  - 2 x daily - 7 x weekly - 1 sets - 10 reps - 10 hold      Manuals 2/27 2/24 2/19   L Ankle PROM BR performed w/ effleurage  BR performed                      Neuro Re-Ed      Ankle 4-way   HEP   Heel Raises 2x10 2x10 B/L    SLS Airex 10x10\" Airex 10x10\"    SL RDL 2x10                       Ther Ex      Long Sit Strap Str   HEP   Assessment & Management   POC with emphasis on early ROM with gradual strengthening and stabilization training.   Gastroc Str  10x10\" standing    Soleus Str 5x10\" standing 10x10\" standing      Leg Press  Calf Press  2x10 70# 2x10 Calf Press 2x10 55# into stretch    Step Downs L6 2x10 over cone 10x                Ther Activity                  Gait Training                    Modalities                         "

## 2025-03-03 ENCOUNTER — OFFICE VISIT (OUTPATIENT)
Dept: PHYSICAL THERAPY | Facility: CLINIC | Age: 55
End: 2025-03-03
Payer: COMMERCIAL

## 2025-03-03 DIAGNOSIS — Z48.89 AFTERCARE FOLLOWING SURGERY: ICD-10-CM

## 2025-03-03 DIAGNOSIS — Z87.81 S/P ORIF (OPEN REDUCTION INTERNAL FIXATION) FRACTURE: ICD-10-CM

## 2025-03-03 DIAGNOSIS — Z98.890 S/P ORIF (OPEN REDUCTION INTERNAL FIXATION) FRACTURE: ICD-10-CM

## 2025-03-03 DIAGNOSIS — S82.892D CLOSED FRACTURE OF LEFT ANKLE WITH ROUTINE HEALING, SUBSEQUENT ENCOUNTER: Primary | ICD-10-CM

## 2025-03-03 PROCEDURE — 97112 NEUROMUSCULAR REEDUCATION: CPT | Performed by: PHYSICAL THERAPIST

## 2025-03-03 PROCEDURE — 97110 THERAPEUTIC EXERCISES: CPT | Performed by: PHYSICAL THERAPIST

## 2025-03-03 NOTE — PROGRESS NOTES
Daily Note     Today's date: 3/3/2025  Patient name: Levi Jin  : 1970  MRN: [id number]  Referring provider: Sanjay Jackson*  Dx:   Encounter Diagnosis     ICD-10-CM    1. Closed fracture of left ankle with routine healing, subsequent encounter  S82.892D       2. S/P ORIF (open reduction internal fixation) fracture  Z98.890     Z87.81       3. Aftercare following surgery  Z48.89                      Subjective: Patient reports he has been feeling better and his swelling is going down.      Objective: See treatment diary below      Assessment: Tolerated treatment well. Patient continues to progress with plantar flexion strength.  Remaining limitation with dorsiflexion is apparent with increased gait speed translating to feeling of foot slap.  Patient demonstrated fatigue post treatment, exhibited good technique with therapeutic exercises, and would benefit from continued PT      Plan: Continue per plan of care.  Progress treatment as tolerated.  Progress challenge as appropriate with irritability.     Precautions:   Past Medical History:   Diagnosis Date    Patient denies medical problems     Tinnitus of both ears         Insurance:  [place]/CMS Eval/ Re-eval Auth #/ Referral # Total units or visits Start date  Expiration date KX? Visit limitation?  PT only or  PT+OT? Co-Insurance   CMS 25                      POC Start Date POC Expiration Date Signed POC?   25       Date 2/19 2/24 2/27 3/3           Visits/Units:  Used 1 2 3 4           Authed:  Remaining                  [employer]:  Access Code: [email or web address]  URL: [email or web address]  Date: 2025  Prepared by: Garrett Lema    Exercises  - Long Sitting Ankle Eversion with Resistance  - 2 x daily - 7 x weekly - 3 sets - 10 reps  - Long Sitting Ankle Plantar Flexion with Resistance  - 2 x daily - 7 x weekly - 3 sets - 10 reps  - Long Sitting Ankle Inversion with Resistance  - 2 x daily - 7 x weekly - 3 sets  "- 10 reps  - Long Sitting Ankle Dorsiflexion with Anchored Resistance  - 2 x daily - 7 x weekly - 3 sets - 10 reps  - Long Sitting Calf Stretch with Strap  - 2 x daily - 7 x weekly - 1 sets - 10 reps - 10 hold      Manuals 3/3 2/27 2/24 2/19   L Ankle PROM  BR performed w/ effleurage  BR performed                         Neuro Re-Ed       Ankle 4-way    HEP   Heel Raises 2x10 on slantboard 2x10 2x10 B/L    SLS Airex 2x10 multi-directional movements Airex 10x10\" Airex 10x10\"    SL RDL  2x10     Sliders Anterior slide 2x10      Lat Step Downs L6 2x10             Ther Ex       Long Sit Strap Str    HEP   Assessment & Management    POC with emphasis on early ROM with gradual strengthening and stabilization training.   Gastroc Str   10x10\" standing    Soleus Str  5x10\" standing 10x10\" standing      Leg Press  Calf Press 3x10 80# Calf Press  2x10 70# 2x10 Calf Press 2x10 55# into stretch    Step Downs L6 overs 2x10 L6 2x10 over cone 10x                  Ther Activity                     Gait Training                       Modalities                              "

## 2025-03-06 ENCOUNTER — OFFICE VISIT (OUTPATIENT)
Dept: PHYSICAL THERAPY | Facility: CLINIC | Age: 55
End: 2025-03-06
Payer: COMMERCIAL

## 2025-03-06 DIAGNOSIS — Z87.81 S/P ORIF (OPEN REDUCTION INTERNAL FIXATION) FRACTURE: ICD-10-CM

## 2025-03-06 DIAGNOSIS — Z98.890 S/P ORIF (OPEN REDUCTION INTERNAL FIXATION) FRACTURE: ICD-10-CM

## 2025-03-06 DIAGNOSIS — Z48.89 AFTERCARE FOLLOWING SURGERY: ICD-10-CM

## 2025-03-06 DIAGNOSIS — S82.892D CLOSED FRACTURE OF LEFT ANKLE WITH ROUTINE HEALING, SUBSEQUENT ENCOUNTER: Primary | ICD-10-CM

## 2025-03-06 PROCEDURE — 97112 NEUROMUSCULAR REEDUCATION: CPT | Performed by: PHYSICAL THERAPIST

## 2025-03-06 PROCEDURE — 97110 THERAPEUTIC EXERCISES: CPT | Performed by: PHYSICAL THERAPIST

## 2025-03-06 NOTE — PROGRESS NOTES
Daily Note     Today's date: 3/6/2025  Patient name: Levi Jin  : 1970  MRN: [id number]  Referring provider: Sanjay Jackson*  Dx:   Encounter Diagnosis     ICD-10-CM    1. Closed fracture of left ankle with routine healing, subsequent encounter  S82.892D       2. S/P ORIF (open reduction internal fixation) fracture  Z98.890     Z87.81       3. Aftercare following surgery  Z48.89                      Subjective: Patient reports he continues to feel a block in the front of the ankle.      Objective: See treatment diary below      Assessment: Tolerated treatment fair. Patient continues to demonstrate tightness with dorsiflexion, though overall ROM has improved.  Educated to continue frequent repetitions of dorsiflexion to improve both soleus and gastroc flexibility.  Patient demonstrated fatigue post treatment, exhibited good technique with therapeutic exercises, and would benefit from continued PT      Plan: Continue per plan of care.  Progress treatment as tolerated.  Progress challenge as appropriate with irritability.     Precautions:   Past Medical History:   Diagnosis Date    Patient denies medical problems     Tinnitus of both ears         Insurance:  [place]/CMS Eval/ Re-eval Auth #/ Referral # Total units or visits Start date  Expiration date KX? Visit limitation?  PT only or  PT+OT? Co-Insurance   CMS 25                      POC Start Date POC Expiration Date Signed POC?   25       Date 2/19 2/24 2/27 3/3 3/6          Visits/Units:  Used 1 2 3 4 5          Authed:  Remaining                  [employer]:  Access Code: [email or web address]  URL: [email or web address]  Date: 2025  Prepared by: Garrett Lema    Exercises  - Long Sitting Ankle Eversion with Resistance  - 2 x daily - 7 x weekly - 3 sets - 10 reps  - Long Sitting Ankle Plantar Flexion with Resistance  - 2 x daily - 7 x weekly - 3 sets - 10 reps  - Long Sitting Ankle Inversion with Resistance  - 2 x  "daily - 7 x weekly - 3 sets - 10 reps  - Long Sitting Ankle Dorsiflexion with Anchored Resistance  - 2 x daily - 7 x weekly - 3 sets - 10 reps  - Long Sitting Calf Stretch with Strap  - 2 x daily - 7 x weekly - 1 sets - 10 reps - 10 hold      Manuals 3/6 3/3 2/27 2/24 2/19   L Ankle PROM   BR performed w/ effleurage  BR performed                            Neuro Re-Ed        Ankle 4-way     HEP   Heel Raises 2x10 on slantboard 2x10 on slantboard 2x10 2x10 B/L    SLS Star excursion 2x10 Airex 2x10 multi-directional movements Airex 10x10\" Airex 10x10\"    SL RDL 2x10  2x10     Sliders  Anterior slide 2x10      Lat Step Downs  L6 2x10              Ther Ex        Long Sit Strap Str     HEP   Assessment & Management POC with emphasis on frequent mobility exercise and standing exercises.    POC with emphasis on early ROM with gradual strengthening and stabilization training.   Gastroc Str    10x10\" standing    Soleus Str Slantboard 10x10\"  5x10\" standing 10x10\" standing      Leg Press  Calf Press 3x10 85# Calf Press 3x10 80# Calf Press  2x10 70# 2x10 Calf Press 2x10 55# into stretch    Step Downs  L6 overs 2x10 L6 2x10 over cone 10x    Lunge PF 2x10               Ther Activity                        Gait Training                          Modalities                                   "

## 2025-03-10 ENCOUNTER — OFFICE VISIT (OUTPATIENT)
Dept: PHYSICAL THERAPY | Facility: CLINIC | Age: 55
End: 2025-03-10
Payer: COMMERCIAL

## 2025-03-10 DIAGNOSIS — Z87.81 S/P ORIF (OPEN REDUCTION INTERNAL FIXATION) FRACTURE: ICD-10-CM

## 2025-03-10 DIAGNOSIS — S82.892D CLOSED FRACTURE OF LEFT ANKLE WITH ROUTINE HEALING, SUBSEQUENT ENCOUNTER: Primary | ICD-10-CM

## 2025-03-10 DIAGNOSIS — Z48.89 AFTERCARE FOLLOWING SURGERY: ICD-10-CM

## 2025-03-10 DIAGNOSIS — Z98.890 S/P ORIF (OPEN REDUCTION INTERNAL FIXATION) FRACTURE: ICD-10-CM

## 2025-03-10 PROCEDURE — 97110 THERAPEUTIC EXERCISES: CPT | Performed by: PHYSICAL THERAPIST

## 2025-03-10 PROCEDURE — 97140 MANUAL THERAPY 1/> REGIONS: CPT | Performed by: PHYSICAL THERAPIST

## 2025-03-10 PROCEDURE — 97112 NEUROMUSCULAR REEDUCATION: CPT | Performed by: PHYSICAL THERAPIST

## 2025-03-10 NOTE — PROGRESS NOTES
Daily Note     Today's date: 3/10/2025  Patient name: Levi Jin  : 1970  MRN: [id number]  Referring provider: Sanjay Jackson*  Dx:   Encounter Diagnosis     ICD-10-CM    1. Closed fracture of left ankle with routine healing, subsequent encounter  S82.892D       2. S/P ORIF (open reduction internal fixation) fracture  Z98.890     Z87.81       3. Aftercare following surgery  Z48.89                      Subjective: Patient reports she no longer feels pain when he walks, though he continues to feel that block in his ankle when bending his knee forward.     Objective: See treatment diary below      Assessment: Tolerated treatment well. Patient tolerated joint mobilization well, improved dorsiflexion ROM and decreased reported sense of obstruction with loaded dorsiflexion with knee flexed.  Patient demonstrated fatigue post treatment, exhibited good technique with therapeutic exercises, and would benefit from continued PT      Plan: Continue per plan of care.  Progress treatment as tolerated.  Progress challenge as appropriate with irritability.     Precautions:   Past Medical History:   Diagnosis Date    Patient denies medical problems     Tinnitus of both ears         Insurance:  A/CMS Eval/ Re-eval Auth #/ Referral # Total units or visits Start date  Expiration date KX? Visit limitation?  PT only or  PT+OT? Co-Insurance   CMS 25                      POC Start Date POC Expiration Date Signed POC?   25       Date 2/19 2/24 2/27 3/3 3/6 3/10         Visits/Units:  Used 1 2 3 4 5 6         Authed:  [facility]                  [employer]:  Access Code: [email or web address]  URL: [email or web address]  Date: 2025  Prepared by: Garrett Lema    Exercises  - Long Sitting Ankle Eversion with Resistance  - 2 x daily - 7 x weekly - 3 sets - 10 reps  - Long Sitting Ankle Plantar Flexion with Resistance  - 2 x daily - 7 x weekly - 3 sets - 10 reps  - Long Sitting Ankle  "Inversion with Resistance  - 2 x daily - 7 x weekly - 3 sets - 10 reps  - Long Sitting Ankle Dorsiflexion with Anchored Resistance  - 2 x daily - 7 x weekly - 3 sets - 10 reps  - Long Sitting Calf Stretch with Strap  - 2 x daily - 7 x weekly - 1 sets - 10 reps - 10 hold      Manuals 3/10 3/6 3/3 2/27 2/24 2/19   L Ankle PROM BR performed all directions   BR performed w/ effleurage  BR performed                               Neuro Re-Ed         Ankle 4-way      HEP   Heel Raises 2x10 on slantboard 2x 2x10 on slantboard 2x10 on slantboard 2x10 2x10 B/L    SLS  Star excursion 2x10 Airex 2x10 multi-directional movements Airex 10x10\" Airex 10x10\"    SL RDL  2x10  2x10     Sliders   Anterior slide 2x10      Lat Step Downs   L6 2x10      Dorsiflexion for Repetitions 2x10        Ther Ex         Long Sit Strap Str      HEP   Assessment & Management POC with emphasis on continued emphasis on repeated soleus stretch POC with emphasis on frequent mobility exercise and standing exercises.    POC with emphasis on early ROM with gradual strengthening and stabilization training.   Gastroc Str     10x10\" standing    Soleus Str Slantboard 10x10\" Slantboard 10x10\"  5x10\" standing 10x10\" standing      Leg Press   Calf Press 3x10 85# Calf Press 3x10 80# Calf Press  2x10 70# 2x10 Calf Press 2x10 55# into stretch    Step Downs   L6 overs 2x10 L6 2x10 over cone 10x    Lunge PF 3x10 w/ PT manual OP for dorsiflexion light pressure 2x10                Ther Activity                           Gait Training                             Modalities                                        "

## 2025-03-13 ENCOUNTER — OFFICE VISIT (OUTPATIENT)
Dept: PHYSICAL THERAPY | Facility: CLINIC | Age: 55
End: 2025-03-13
Payer: COMMERCIAL

## 2025-03-13 DIAGNOSIS — S82.892D CLOSED FRACTURE OF LEFT ANKLE WITH ROUTINE HEALING, SUBSEQUENT ENCOUNTER: Primary | ICD-10-CM

## 2025-03-13 DIAGNOSIS — Z87.81 S/P ORIF (OPEN REDUCTION INTERNAL FIXATION) FRACTURE: ICD-10-CM

## 2025-03-13 DIAGNOSIS — Z98.890 S/P ORIF (OPEN REDUCTION INTERNAL FIXATION) FRACTURE: ICD-10-CM

## 2025-03-13 DIAGNOSIS — Z48.89 AFTERCARE FOLLOWING SURGERY: ICD-10-CM

## 2025-03-13 PROCEDURE — 97110 THERAPEUTIC EXERCISES: CPT | Performed by: PHYSICAL THERAPIST

## 2025-03-13 PROCEDURE — 97140 MANUAL THERAPY 1/> REGIONS: CPT | Performed by: PHYSICAL THERAPIST

## 2025-03-13 NOTE — PROGRESS NOTES
"PT Re-Evaluation     Today's date: 3/13/2025  Patient name: Levi Jin  : 1970  MRN: [id number]  Referring provider: Sanjay Jackson*  Dx:   Encounter Diagnosis     ICD-10-CM    1. Closed fracture of left ankle with routine healing, subsequent encounter  S82.892D       2. Aftercare following surgery  Z48.89       3. S/P ORIF (open reduction internal fixation) fracture  Z98.890     Z87.81                      Assessment  Impairments: abnormal gait, abnormal or restricted ROM, abnormal movement, activity intolerance, impaired balance, impaired physical strength, lacks appropriate home exercise program, pain with function, weight-bearing intolerance, poor body mechanics and unable to perform ADL     Assessment details: Levi Jin is a 54 y.o. male  who presents status post L ankle ORIF of 10 weeks with the associated impairments including: pain, decreased strength, decreased ROM, decreased joint mobility, joint effusion, and ambulatory dysfunction.  He demonstrates good progress with plantar flexion strength.  He demonstrates good progress with dorsiflexion and plantar flexion ROM.  He continues to have deficits with dorsiflexion in closed chain.  Patient is able to relieve the \"block\" symptom with mild overpressure on the talus into dorsiflexion.  He demonstrates significant improvements in gait sequence with decreased trunk lean.  He continues to have the following functional limitations including: restricted ADL's, prolonged standing, prolonged sitting, transfers, squatting, functional mobility, recreational activities, work-related activities, lifting/carrying, inability to golf, inability to work, and inability to skii. Patient's signs and symptoms are consistent with that of the referring diagnosis.       Patient would likely benefit from continued skilled physical therapy services to address their aforementioned functional limitations through a targeted program consisting of repeated " ROM/flexibility exercises, graded strengthening to global ankle/LE muscles, static/dynamic balance exercises, and graded increase in functional activity training in order to progress towards prior level of function and independence with home exercise program.          Understanding of Dx/Px/POC: good     Prognosis: good     Goals  STG: to be achieved within 2-4 weeks  1. Pt will be able to ambulate community distances with normal heel to toe pattern.-met  2. Improve strength so that pt will be be able to perform sit to stand transfers without UE support.-met  3. Patient will be able to ascend steps without pain or limitation.-progressing towards  4. Pt will be I with HEP-progressing towards     LTG:  to be achieved within 4-8 weeks  1. Pt will be able to negotiate stairs reciprocally without hand rail assistance.-progressing towards  2. Improve SLS balance with EO of greater than 30s for facilitation of return to skiing.-progressing towards  3. Pt will be able to ambulate without time restrictions pain free.-progressing towards  4. Pt will return to squatting and lifting 40 pounds without pain or restriction.-progressing towards  5. Pt will return to skiing without limitation (6-8 months)-progressing towards        Plan  Patient would benefit from: PT eval and skilled physical therapy  Planned modality interventions: cryotherapy, TENS, thermotherapy: hydrocollator packs, electrical stimulation/Fijian stimulation and manual electrical stimulation     Planned therapy interventions: manual therapy, neuromuscular re-education, self care, therapeutic activities, therapeutic exercise, home exercise program, joint mobilization, balance, gait training, flexibility, strengthening, stretching, patient education, ADL training, activity modification, body mechanics training, group therapy, graded exercise, graded activity, functional ROM exercises and therapeutic training     Frequency: 2x week  Duration in weeks: 8  Treatment  plan discussed with: patient  Plan details: HEP development, stretching, strengthening, A/AA/PROM, joint mobilizations, posture education, STM/MI as needed to reduce muscle tension, muscle reeducation, PLOC discussed and agreed upon with patient.     Subjective Evaluation     History of Present Illness  Date of surgery: 1/3/2025  Mechanism of injury: surgery  Mechanism of injury: Levi Jin is a 54 y.o. male who presents status post L ankle ORIF of 10 weeks after a skiing accident.  Patient reports he feels less pain and limitation with walking, though descending steps continues to be a significant challenge during which he feels he has to drop for the last couple inches.  Additionally, he feels a block in the front of his ankle when stretching his calf.  His knees also contribute to difficulty descending the steps.          Not a recurrent problem   Quality of life: good     Patient Goals  Patient goals for therapy: increased strength, decreased pain, independence with ADLs/IADLs, return to sport/leisure activities, increased motion, improved balance and return to work  Patient goal: Return to skiing, golf, and all work activities without limitation.  Pain  Current pain ratin  At best pain ratin  At worst pain rating: 3  Quality: discomfort, tight, radiating and dull ache  Relieving factors: rest  Aggravating factors: walking and stair climbing     Social Support  Steps to enter house: yes  Stairs in house: yes   Lives in: multiple-level home  Lives with: spouse     Employment status: working  Hand dominance: right  Exercise history: Skiing and heavy occupational work        Objective      Active Range of Motion   Left Ankle/Foot   Dorsiflexion (ke): 15 degrees   Plantar flexion: 45 degrees with pain  Inversion: 30 degrees with pain  Eversion: 10 degrees      Right Ankle/Foot   Dorsiflexion (ke): 20 degrees   Plantar flexion: 65 degrees   Inversion: 45 degrees   Eversion: 10 degrees      Strength/Myotome  Testing      Left Ankle/Foot   Dorsiflexion: 4  Plantar flexion: 4-  Inversion: 4-  Eversion: 4-     Right Ankle/Foot   Dorsiflexion: 4+  Plantar flexion: 4+  Inversion: 4+  Eversion: 4+     Additional Strength Details  Ambulation: Increased speed of heel off on the L LE compared to R.     Squat: Mild trunk lean to L     Step Ups/Downs: Mild valgus and excessive hip hinge.     SLS: L: 12s  R: 30s.          Precautions:   Past Medical History:   Diagnosis Date    Patient denies medical problems     Tinnitus of both ears         Insurance:  [employer]/CMS Eval/ Re-eval Auth #/ Referral # Total units or visits Start date  Expiration date KX? Visit limitation?  PT only or  PT+OT? Co-Insurance   CMS 2/19/25 2/19/25                      POC Start Date POC Expiration Date Signed POC?   2/19/25 4/18/25 signed   3/13/25 5/11/25 pend      Date 2/19 2/24 2/27 3/3 3/6 3/10 3/13        Visits/Units:  Used 1 2 3 4 5 6 7        Authed:  Remaining                  [employer]:  Access Code: [email or web address]  URL: [email or web address]  Date: 02/19/2025  Prepared by: Garrett Lema    Exercises  - Long Sitting Ankle Eversion with Resistance  - 2 x daily - 7 x weekly - 3 sets - 10 reps  - Long Sitting Ankle Plantar Flexion with Resistance  - 2 x daily - 7 x weekly - 3 sets - 10 reps  - Long Sitting Ankle Inversion with Resistance  - 2 x daily - 7 x weekly - 3 sets - 10 reps  - Long Sitting Ankle Dorsiflexion with Anchored Resistance  - 2 x daily - 7 x weekly - 3 sets - 10 reps  - Long Sitting Calf Stretch with Strap  - 2 x daily - 7 x weekly - 1 sets - 10 reps - 10 hold      Manuals 3/13 3/10 3/6 3/3 2/27 2/24 2/19   L Ankle PROM BR performed all directions BR performed all directions   BR performed w/ effleurage  BR performed                                  Neuro Re-Ed          Ankle 4-way       HEP   Heel Raises 2x10 on slantboard 2x10 on slantboard 2x 2x10 on slantboard 2x10 on slantboard 2x10 2x10 B/L    SLS   Star excursion 2x10  "Airex 2x10 multi-directional movements Airex 10x10\" Airex 10x10\"    SL RDL   2x10  2x10     Sliders    Anterior slide 2x10      Lat Step Downs    L6 2x10      Dorsiflexion for Repetitions  2x10        Ther Ex          Dorsiflexion Str 10x10\" sitting on chair      HEP   Assessment & Management  POC with emphasis on continued emphasis on repeated soleus stretch POC with emphasis on frequent mobility exercise and standing exercises.    POC with emphasis on early ROM with gradual strengthening and stabilization training.   Gastroc Str      10x10\" standing    Soleus Str Slantboard 10x10\" Slantboard 10x10\" Slantboard 10x10\"  5x10\" standing 10x10\" standing      Leg Press  Calf Press 3x10 85#  Calf Press 3x10 85# Calf Press 3x10 80# Calf Press  2x10 70# 2x10 Calf Press 2x10 55# into stretch    Step Downs    L6 overs 2x10 L6 2x10 over cone 10x    Lunge PF 2x10 w/ PT manual OP light OP 3x10 w/ PT manual OP for dorsiflexion light pressure 2x10                 Ther Activity                              Gait Training                                Modalities                                                  "

## 2025-03-17 ENCOUNTER — OFFICE VISIT (OUTPATIENT)
Dept: PHYSICAL THERAPY | Facility: CLINIC | Age: 55
End: 2025-03-17
Payer: COMMERCIAL

## 2025-03-17 DIAGNOSIS — S82.892D CLOSED FRACTURE OF LEFT ANKLE WITH ROUTINE HEALING, SUBSEQUENT ENCOUNTER: Primary | ICD-10-CM

## 2025-03-17 DIAGNOSIS — Z87.81 S/P ORIF (OPEN REDUCTION INTERNAL FIXATION) FRACTURE: ICD-10-CM

## 2025-03-17 DIAGNOSIS — Z48.89 AFTERCARE FOLLOWING SURGERY: ICD-10-CM

## 2025-03-17 DIAGNOSIS — Z98.890 S/P ORIF (OPEN REDUCTION INTERNAL FIXATION) FRACTURE: ICD-10-CM

## 2025-03-17 PROCEDURE — 97112 NEUROMUSCULAR REEDUCATION: CPT | Performed by: PHYSICAL THERAPIST

## 2025-03-17 PROCEDURE — 97110 THERAPEUTIC EXERCISES: CPT | Performed by: PHYSICAL THERAPIST

## 2025-03-17 PROCEDURE — 97140 MANUAL THERAPY 1/> REGIONS: CPT | Performed by: PHYSICAL THERAPIST

## 2025-03-17 NOTE — PROGRESS NOTES
Daily Note     Today's date: 3/17/2025  Patient name: Levi Jin  : 1970  MRN: [id number]  Referring provider: Sanjay Jackson*  Dx:   Encounter Diagnosis     ICD-10-CM    1. Closed fracture of left ankle with routine healing, subsequent encounter  S82.892D       2. Aftercare following surgery  Z48.89       3. S/P ORIF (open reduction internal fixation) fracture  Z98.890     Z87.81                      Subjective: Patient reports he feels less of the block feeling when stretching forward into dorsiflexion, though his active dorsiflexion remains significantly weak.        Objective: See treatment diary below      Assessment: Tolerated treatment well. Patient continues to progress with dorsiflexion ROM, responding well to increased emphasis on joint mobility.  Continued to emphasize increased repetitions of unloading tibialis anterior strengthening, as patient continues to have foot slap with gait.  Patient demonstrated fatigue post treatment, exhibited good technique with therapeutic exercises, and would benefit from continued PT      Plan: Continue per plan of care.  Progress treatment as tolerated.  Progress challenge as appropriate with irritability.     Precautions:   Past Medical History:   Diagnosis Date    Patient denies medical problems     Tinnitus of both ears         Insurance:  A/CMS Eval/ Re-eval Auth #/ Referral # Total units or visits Start date  Expiration date KX? Visit limitation?  PT only or  PT+OT? Co-Insurance   CMS 25                      POC Start Date POC Expiration Date Signed POC?   25 signed   3/13/25 5/11/25 pend      Date 2/19 2/24 2/27 3/3 3/6 3/10 3/13 3/17       Visits/Units:  Used 1 2 3 4 5 6 7 8       Authed:  Remaining                  [employer]:  Access Code: [email or web address]  URL: [email or web address]  Date: 2025  Prepared by: Garrett Lema    Exercises  - Long Sitting Ankle Eversion with Resistance  - 2 x daily - 7 x  "weekly - 3 sets - 10 reps  - Long Sitting Ankle Plantar Flexion with Resistance  - 2 x daily - 7 x weekly - 3 sets - 10 reps  - Long Sitting Ankle Inversion with Resistance  - 2 x daily - 7 x weekly - 3 sets - 10 reps  - Long Sitting Ankle Dorsiflexion with Anchored Resistance  - 2 x daily - 7 x weekly - 3 sets - 10 reps  - Long Sitting Calf Stretch with Strap  - 2 x daily - 7 x weekly - 1 sets - 10 reps - 10 hold      Manuals 3/17 3/13 3/10 3/6 3/3 2/27 2/24 2/19   L Ankle PROM BR performed all directions BR performed all directions BR performed all directions   BR performed w/ effleurage  BR performed                                     Neuro Re-Ed           Ankle 4-way        HEP   Heel Raises  2x10 on slantboard 2x10 on slantboard 2x 2x10 on slantboard 2x10 on slantboard 2x10 2x10 B/L    SLS Star excursion 2x10   Star excursion 2x10 Airex 2x10 multi-directional movements Airex 10x10\" Airex 10x10\"    SL RDL    2x10  2x10     Sliders     Anterior slide 2x10      Lat Step Downs     L6 2x10      Dorsiflexion for Repetitions 3x10 w/ BTB  2x10        Ther Ex           Dorsiflexion Str  10x10\" sitting on chair      HEP   Assessment & Management POC with continued repetitions with dorsiflexor strengthening.  POC with emphasis on continued emphasis on repeated soleus stretch POC with emphasis on frequent mobility exercise and standing exercises.    POC with emphasis on early ROM with gradual strengthening and stabilization training.   Gastroc Str       10x10\" standing    Soleus Str  Slantboard 10x10\" Slantboard 10x10\" Slantboard 10x10\"  5x10\" standing 10x10\" standing      Leg Press  Calf press 4x10 85# Calf Press 3x10 85#  Calf Press 3x10 85# Calf Press 3x10 80# Calf Press  2x10 70# 2x10 Calf Press 2x10 55# into stretch    Step Downs     L6 overs 2x10 L6 2x10 over cone 10x    Lunge PF 2x10 w/ PT manual OP light 2x10 w/ PT manual OP light OP 3x10 w/ PT manual OP for dorsiflexion light pressure 2x10                  Ther " Activity                                 Gait Training                                   Modalities

## 2025-03-20 ENCOUNTER — OFFICE VISIT (OUTPATIENT)
Dept: PHYSICAL THERAPY | Facility: CLINIC | Age: 55
End: 2025-03-20
Payer: COMMERCIAL

## 2025-03-20 DIAGNOSIS — Z87.81 S/P ORIF (OPEN REDUCTION INTERNAL FIXATION) FRACTURE: ICD-10-CM

## 2025-03-20 DIAGNOSIS — S82.892D CLOSED FRACTURE OF LEFT ANKLE WITH ROUTINE HEALING, SUBSEQUENT ENCOUNTER: Primary | ICD-10-CM

## 2025-03-20 DIAGNOSIS — Z98.890 S/P ORIF (OPEN REDUCTION INTERNAL FIXATION) FRACTURE: ICD-10-CM

## 2025-03-20 DIAGNOSIS — Z48.89 AFTERCARE FOLLOWING SURGERY: ICD-10-CM

## 2025-03-20 PROCEDURE — 97110 THERAPEUTIC EXERCISES: CPT | Performed by: PHYSICAL THERAPIST

## 2025-03-20 PROCEDURE — 97112 NEUROMUSCULAR REEDUCATION: CPT | Performed by: PHYSICAL THERAPIST

## 2025-03-20 PROCEDURE — 97140 MANUAL THERAPY 1/> REGIONS: CPT | Performed by: PHYSICAL THERAPIST

## 2025-03-20 NOTE — PROGRESS NOTES
Daily Note     Today's date: 3/20/2025  Patient name: Levi Jin  : 1970  MRN: [id number]  Referring provider: Sanjay Jackson*  Dx:   Encounter Diagnosis     ICD-10-CM    1. Closed fracture of left ankle with routine healing, subsequent encounter  S82.892D       2. Aftercare following surgery  Z48.89       3. S/P ORIF (open reduction internal fixation) fracture  Z98.890     Z87.81                      Subjective: Patient reports he feels about the same overall, he continues to have significant difficulty lifting his toes and has difficulty with catching his feet.      Objective: See treatment diary below      Assessment: Tolerated treatment well. Patient continues to have significant weakness with dorsiflexion.  Emphasized continued repeated dorsiflexor activation and strengthening exercises as part of his HEP.  Patient demonstrated fatigue post treatment, exhibited good technique with therapeutic exercises, and would benefit from continued PT      Plan: Continue per plan of care.  Progress treatment as tolerated.  Progress challenge as appropriate with irritability.     Precautions:   Past Medical History:   Diagnosis Date    Patient denies medical problems     Tinnitus of both ears         Insurance:  [employer]/CMS Eval/ Re-eval Auth #/ Referral # Total units or visits Start date  Expiration date KX? Visit limitation?  PT only or  PT+OT? Co-Insurance   CMS 25                      POC Start Date POC Expiration Date Signed POC?   25 signed   3/13/25 5/11/25 pend      Date  3/3 3/6 3/10 3/13 3/17 3/20      Visits/Units:  Used 1 2 3 4 5 6 7 8 9      Authed:  Remaining                  [employer]:  Access Code: [email or web address]  URL: [email or web address]  Date: 2025  Prepared by: Garrett Lema    Exercises  - Long Sitting Ankle Eversion with Resistance  - 2 x daily - 7 x weekly - 3 sets - 10 reps  - Long Sitting Ankle Plantar Flexion with Resistance  -  "2 x daily - 7 x weekly - 3 sets - 10 reps  - Long Sitting Ankle Inversion with Resistance  - 2 x daily - 7 x weekly - 3 sets - 10 reps  - Long Sitting Ankle Dorsiflexion with Anchored Resistance  - 2 x daily - 7 x weekly - 3 sets - 10 reps  - Long Sitting Calf Stretch with Strap  - 2 x daily - 7 x weekly - 1 sets - 10 reps - 10 hold      Manuals 3/20 3/17 3/13 3/10 3/6 3/3 2/27 2/24 2/19   L Ankle PROM BR performed all directions BR performed all directions BR performed all directions BR performed all directions   BR performed w/ effleurage  BR performed                                        Neuro Re-Ed            Ankle 4-way         HEP   Heel Raises   2x10 on slantboard 2x10 on slantboard 2x 2x10 on slantboard 2x10 on slantboard 2x10 2x10 B/L    SLS Star excursion 2x10 Star excursion 2x10   Star excursion 2x10 Airex 2x10 multi-directional movements Airex 10x10\" Airex 10x10\"    SL RDL     2x10  2x10     Sliders      Anterior slide 2x10      Lat Step Downs      L6 2x10      Dorsiflexion for Repetitions 3x10 standing with rail UE support 3x10 w/ BTB  2x10        Ther Ex            Dorsiflexion Str   10x10\" sitting on chair      HEP   Assessment & Management  POC with continued repetitions with dorsiflexor strengthening.  POC with emphasis on continued emphasis on repeated soleus stretch POC with emphasis on frequent mobility exercise and standing exercises.    POC with emphasis on early ROM with gradual strengthening and stabilization training.   Gastroc Str        10x10\" standing    Soleus Str   Slantboard 10x10\" Slantboard 10x10\" Slantboard 10x10\"  5x10\" standing 10x10\" standing      Leg Press  Calf press 4x10 100# Calf press 4x10 85# Calf Press 3x10 85#  Calf Press 3x10 85# Calf Press 3x10 80# Calf Press  2x10 70# 2x10 Calf Press 2x10 55# into stretch    Step Downs      L6 overs 2x10 L6 2x10 over cone 10x    Lunge PF 10x w/ manual OP light 2x10 w/ PT manual OP light 2x10 w/ PT manual OP light OP 3x10 w/ PT manual " OP for dorsiflexion light pressure 2x10                   Ther Activity                                    Gait Training                                      Modalities

## 2025-03-24 ENCOUNTER — OFFICE VISIT (OUTPATIENT)
Dept: PHYSICAL THERAPY | Facility: CLINIC | Age: 55
End: 2025-03-24
Payer: COMMERCIAL

## 2025-03-24 DIAGNOSIS — S82.892D CLOSED FRACTURE OF LEFT ANKLE WITH ROUTINE HEALING, SUBSEQUENT ENCOUNTER: Primary | ICD-10-CM

## 2025-03-24 DIAGNOSIS — Z48.89 AFTERCARE FOLLOWING SURGERY: ICD-10-CM

## 2025-03-24 PROCEDURE — 97140 MANUAL THERAPY 1/> REGIONS: CPT | Performed by: PHYSICAL THERAPIST

## 2025-03-24 PROCEDURE — 97110 THERAPEUTIC EXERCISES: CPT | Performed by: PHYSICAL THERAPIST

## 2025-03-24 PROCEDURE — 97112 NEUROMUSCULAR REEDUCATION: CPT | Performed by: PHYSICAL THERAPIST

## 2025-03-24 NOTE — PROGRESS NOTES
PT Re-Evaluation     Today's date: 3/24/2025  Patient name: Levi Jin  : 1970  MRN: [id number]  Referring provider: Sanjay Jackson*  Dx:   Encounter Diagnosis     ICD-10-CM    1. Closed fracture of left ankle with routine healing, subsequent encounter  S82.892D       2. Aftercare following surgery  Z48.89                    Assessment  Impairments: abnormal gait, abnormal or restricted ROM, abnormal movement, activity intolerance, impaired balance, impaired physical strength, lacks appropriate home exercise program, pain with function, weight-bearing intolerance, poor body mechanics and unable to perform ADL     Assessment details: Levi Jin is a 54 y.o. male  who presents status post L ankle ORIF of 12 weeks with the associated impairments.  He demonstrates objective improvements in dorsiflexion ROM and plantar flexion strength with standing and single limb support movements.  He continues to demonstrate significant balance deficits, particularly with dynamic movements that are a big portion of his job functions.  The greatest limitation is dorsiflexion strength, as he continues to have significant weakness which has equated to foot clearance deficits and foot slap with gait.  He has been diligent with dorsiflexor strengthen exercises with HEP, though he strengthening has been a slow process.  He continues to have the following functional limitations including: restricted ADL's, prolonged standing, prolonged sitting, transfers, squatting, functional mobility, recreational activities, work-related activities, lifting/carrying, inability to golf, inability to work, and inability to skii. Patient's signs and symptoms are consistent with that of the referring diagnosis.       Patient would likely benefit from continued skilled physical therapy services to address their aforementioned functional limitations through a targeted program consisting of repeated ROM/flexibility exercises, graded  strengthening to global ankle/LE muscles, static/dynamic balance exercises, and graded increase in functional activity training in order to progress towards prior level of function and independence with home exercise program.          Understanding of Dx/Px/POC: good     Prognosis: good     Goals  STG: to be achieved within 2-4 weeks  1. Pt will be able to ambulate community distances with normal heel to toe pattern.-met  2. Improve strength so that pt will be be able to perform sit to stand transfers without UE support.-met  3. Patient will be able to ascend steps without pain or limitation.-met at times  4. Pt will be I with HEP-met     LTG:  to be achieved within 4-8 weeks  1. Pt will be able to negotiate stairs reciprocally without hand rail assistance.-progressing towards  2. Improve SLS balance with EO of greater than 30s for facilitation of return to skiing.-progressing towards  3. Pt will be able to ambulate without time restrictions pain free.-progressing towards  4. Pt will return to squatting and lifting 40 pounds without pain or restriction.-progressing towards  5. Pt will return to skiing without limitation (6-8 months)-progressing towards        Plan  Patient would benefit from: PT eval and skilled physical therapy  Planned modality interventions: cryotherapy, TENS, thermotherapy: hydrocollator packs, electrical stimulation/Prydeinig stimulation and manual electrical stimulation     Planned therapy interventions: manual therapy, neuromuscular re-education, self care, therapeutic activities, therapeutic exercise, home exercise program, joint mobilization, balance, gait training, flexibility, strengthening, stretching, patient education, ADL training, activity modification, body mechanics training, group therapy, graded exercise, graded activity, functional ROM exercises and therapeutic training     Frequency: 2x week  Duration in weeks: 8  Treatment plan discussed with: patient  Plan details: HEP  development, stretching, strengthening, A/AA/PROM, joint mobilizations, posture education, STM/MI as needed to reduce muscle tension, muscle reeducation, PLOC discussed and agreed upon with patient.     Subjective Evaluation     History of Present Illness  Date of surgery: 1/3/2025  Mechanism of injury: surgery  Mechanism of injury: Levi Jin is a 54 y.o. male who presents status post L ankle ORIF of 12 weeks after a skiing accident.  Patient reports he feels his dorsiflexion ROM is improving as he is able to move his knee over his toe now with squatting and climbing steps.  He reports his greatest problem is weakness with active dorsiflexion.  He has foot clearance issues when walking and stepping over objects, as well as difficulty with stair climbing and avoiding clipping steps. He reports having to climb ladders and step over objects frequently at work, so this remains a substantial concern.          Not a recurrent problem   Quality of life: good     Patient Goals  Patient goals for therapy: increased strength, decreased pain, independence with ADLs/IADLs, return to sport/leisure activities, increased motion, improved balance and return to work  Patient goal: Return to skiing, golf, and all work activities without limitation.  Pain  Current pain ratin  At best pain ratin  At worst pain rating: 3  Quality: discomfort, tight, radiating and dull ache  Relieving factors: rest  Aggravating factors: walking and stair climbing     Social Support  Steps to enter house: yes  Stairs in house: yes   Lives in: multiple-level home  Lives with: spouse     Employment status: working  Hand dominance: right  Exercise history: Skiing and heavy occupational work        Objective      Active Range of Motion   Left Ankle/Foot   Dorsiflexion (ke): 20 degrees   Plantar flexion: 55 degrees with pain  Inversion: 37 degrees with pain  Eversion: 10 degrees      Right Ankle/Foot   Dorsiflexion (ke): 20 degrees   Plantar  flexion: 65 degrees   Inversion: 45 degrees   Eversion: 10 degrees      Strength/Myotome Testing      Left Ankle/Foot   Dorsiflexion: 4-  Plantar flexion: 4  Inversion: 4  Eversion: 4     Right Ankle/Foot   Dorsiflexion: 4+  Plantar flexion: 4+  Inversion: 4+  Eversion: 4+     Additional Strength Details  Ambulation: Increased speed of heel off on the L LE compared to R.     Squat: Mild trunk lean to L     Step Ups/Downs: Mild valgus and excessive hip hinge.     SLS: L: 13s  R: 30s.         Precautions:     Precautions:   Past Medical History:   Diagnosis Date    Patient denies medical problems     Tinnitus of both ears         Insurance:  [email or web address] Eval/ Re-eval Auth #/ Referral # Total units or visits Start date  Expiration date KX? Visit limitation?  PT only or  PT+OT? Co-Insurance   CMS 2/19/25 2/19/25                      POC Start Date POC Expiration Date Signed POC?   2/19/25 4/18/25 signed   3/13/25 5/11/25 pend      Date 2/19 2/24 2/27 3/3 3/6 3/10 3/13 3/17 3/20 3/24     Visits/Units:  Used 1 2 3 4 5 6 7 8 9 10     Authed:  Remaining                  [employer]:  Access Code: [email or web address]  URL: [email or web address]  Date: 02/19/2025  Prepared by: Garrett Lema    Exercises  - Long Sitting Ankle Eversion with Resistance  - 2 x daily - 7 x weekly - 3 sets - 10 reps  - Long Sitting Ankle Plantar Flexion with Resistance  - 2 x daily - 7 x weekly - 3 sets - 10 reps  - Long Sitting Ankle Inversion with Resistance  - 2 x daily - 7 x weekly - 3 sets - 10 reps  - Long Sitting Ankle Dorsiflexion with Anchored Resistance  - 2 x daily - 7 x weekly - 3 sets - 10 reps  - Long Sitting Calf Stretch with Strap  - 2 x daily - 7 x weekly - 1 sets - 10 reps - 10 hold      Manuals 3/24 3/20 3/17 3/13 3/10 3/6 3/3 2/27 2/24 2/19   L Ankle PROM BR performed all directions BR performed all directions BR performed all directions BR performed all directions BR performed all directions   BR performed w/ effleurage  BR  "performed                                           Neuro Re-Ed             Ankle 4-way          HEP   Heel Raises    2x10 on slantboard 2x10 on slantboard 2x 2x10 on slantboard 2x10 on slantboard 2x10 2x10 B/L    SLS Fwd airex 2x10 Star excursion 2x10 Star excursion 2x10   Star excursion 2x10 Airex 2x10 multi-directional movements Airex 10x10\" Airex 10x10\"    SL RDL Luther 4.0 2x10     2x10  2x10     Sliders       Anterior slide 2x10      Lat Step Downs 10x off step      L6 2x10      Dorsiflexion for Repetitions 10x 3x10 standing with rail UE support 3x10 w/ BTB  2x10        Ther Ex             Dorsiflexion Str    10x10\" sitting on chair      HEP   Assessment & Management Reassess and POC with stabilization and strengthening.  POC with continued repetitions with dorsiflexor strengthening.  POC with emphasis on continued emphasis on repeated soleus stretch POC with emphasis on frequent mobility exercise and standing exercises.    POC with emphasis on early ROM with gradual strengthening and stabilization training.   Gastroc Str         10x10\" standing    Soleus Str    Slantboard 10x10\" Slantboard 10x10\" Slantboard 10x10\"  5x10\" standing 10x10\" standing      Leg Press  Calf press 4x10 110# Calf press 4x10 100# Calf press 4x10 85# Calf Press 3x10 85#  Calf Press 3x10 85# Calf Press 3x10 80# Calf Press  2x10 70# 2x10 Calf Press 2x10 55# into stretch    Step Downs       L6 overs 2x10 L6 2x10 over cone 10x    Lunge PF  10x w/ manual OP light 2x10 w/ PT manual OP light 2x10 w/ PT manual OP light OP 3x10 w/ PT manual OP for dorsiflexion light pressure 2x10       SL Squat w/ TRX 2x10            Ther Activity                                       Gait Training                                         Modalities                                         "

## 2025-03-25 ENCOUNTER — OFFICE VISIT (OUTPATIENT)
Dept: OBGYN CLINIC | Facility: CLINIC | Age: 55
End: 2025-03-25

## 2025-03-25 ENCOUNTER — APPOINTMENT (OUTPATIENT)
Dept: RADIOLOGY | Facility: CLINIC | Age: 55
End: 2025-03-25
Payer: COMMERCIAL

## 2025-03-25 VITALS — BODY MASS INDEX: 28.89 KG/M2 | HEIGHT: 73 IN | WEIGHT: 218 LBS

## 2025-03-25 DIAGNOSIS — Z87.81 S/P ORIF (OPEN REDUCTION INTERNAL FIXATION) FRACTURE: ICD-10-CM

## 2025-03-25 DIAGNOSIS — Z98.890 S/P ORIF (OPEN REDUCTION INTERNAL FIXATION) FRACTURE: ICD-10-CM

## 2025-03-25 DIAGNOSIS — S82.892D CLOSED FRACTURE OF LEFT ANKLE WITH ROUTINE HEALING, SUBSEQUENT ENCOUNTER: ICD-10-CM

## 2025-03-25 DIAGNOSIS — S82.892D CLOSED FRACTURE OF LEFT ANKLE WITH ROUTINE HEALING, SUBSEQUENT ENCOUNTER: Primary | ICD-10-CM

## 2025-03-25 PROCEDURE — 99024 POSTOP FOLLOW-UP VISIT: CPT | Performed by: ORTHOPAEDIC SURGERY

## 2025-03-25 PROCEDURE — 73610 X-RAY EXAM OF ANKLE: CPT

## 2025-03-25 NOTE — PROGRESS NOTES
Patient Name: Levi Jin      : 1970       MRN: [id number]   Encounter Provider: Sanjay Jackson MD   Encounter Date: 25  Encounter department: [facility]         Assessment & Plan  Closed fracture of left ankle with routine healing, subsequent encounter  Levi is not nearly 12 weeks s/p ORIF left distal fibula fracture. We reviewed updated x-rays today, which demonstrate appropriate position of hardware and interval healing.  Able to walk 2 to 3 miles on flat ground.  He still struggles with uneven ground or wearing his with boots for extended periods.  He is not able to perform deep squats or other activities that would be required to return to full duty.  I did reassure him that he is very likely to see continued improvements in his strength function and level of pain.  I would like him to continue physical therapy to work on strengthening.  I explained the return to level of function required for his work and recreational activities is usually a 4 to 6-month process after the surgery.  At this point he would be limited to light duty only.  He is can a follow-up with me in 6 to 8 weeks for repeat evaluation.  Orders:    XR ankle 3+ vw left; Future       _____________________________________________________  CHIEF COMPLAINT:  Chief Complaint   Patient presents with    Left Ankle - Post-op         SUBJECTIVE:  Patient is a 54 y.o. year old male who presents for follow up now 11.5 weeks s/p Left lateral maleolus open reduction internal fixation - Left performed on 1/3/2025.  Today, the patient has been able to walk more and feels he is getting stronger.  His level stiffness is getting less as he feels that he is making progress in physical therapy.  That being said he is somewhat frustrated that he is not able to get back to his normal recreational activities or work still at this point..     _____________________________________________________  PHYSICAL  EXAM:  General/Constitutional: NAD, well developed, well nourished  HENT: Normocephalic, atraumatic  CV: Intact distal pulses, regular rate  Resp: No respiratory distress or labored breathing  Abdomen: soft, nondistended   Lymphatic: No lymphadenopathy palpated  Neuro: Alert and Oriented x 3, no focal deficits  Psych: Normal mood, normal affect  Skin: Warm, dry, no rashes, no erythema    MUSCULOSKELETAL EXAMINATION:    Left Lower Extremity   Incision: Clean, dry, and intact  Range of Motion: 5 dorsiflexion, 15 plantarflexion  +EHL/FHL/TA/GS  SILT throughout  Palpable DP pulse     X-rays reviewed interpreted showing interval healing with no apparent hardware complication    Scribe Attestation      I,:  Phylicia Franco PA-C am acting as a scribe while in the presence of the attending physician.:       I,:  Sanjay Jackson MD personally performed the services described in this documentation    as scribed in my presence.:

## 2025-03-27 ENCOUNTER — OFFICE VISIT (OUTPATIENT)
Dept: PHYSICAL THERAPY | Facility: CLINIC | Age: 55
End: 2025-03-27
Payer: COMMERCIAL

## 2025-03-27 DIAGNOSIS — Z87.81 S/P ORIF (OPEN REDUCTION INTERNAL FIXATION) FRACTURE: ICD-10-CM

## 2025-03-27 DIAGNOSIS — Z48.89 AFTERCARE FOLLOWING SURGERY: ICD-10-CM

## 2025-03-27 DIAGNOSIS — Z98.890 S/P ORIF (OPEN REDUCTION INTERNAL FIXATION) FRACTURE: ICD-10-CM

## 2025-03-27 DIAGNOSIS — S82.892D CLOSED FRACTURE OF LEFT ANKLE WITH ROUTINE HEALING, SUBSEQUENT ENCOUNTER: Primary | ICD-10-CM

## 2025-03-27 PROCEDURE — 97140 MANUAL THERAPY 1/> REGIONS: CPT | Performed by: PHYSICAL THERAPIST

## 2025-03-27 PROCEDURE — 97110 THERAPEUTIC EXERCISES: CPT | Performed by: PHYSICAL THERAPIST

## 2025-03-27 PROCEDURE — 97112 NEUROMUSCULAR REEDUCATION: CPT | Performed by: PHYSICAL THERAPIST

## 2025-03-27 NOTE — PROGRESS NOTES
Daily Note     Today's date: 3/27/2025  Patient name: Levi Jin  : 1970  MRN: [id number]  Referring provider: Sanjay Jackson*  Dx:   Encounter Diagnosis     ICD-10-CM    1. Closed fracture of left ankle with routine healing, subsequent encounter  S82.892D       2. Aftercare following surgery  Z48.89       3. S/P ORIF (open reduction internal fixation) fracture  Z98.890     Z87.81                      Subjective: Patient reports he had a good visit with his surgeon and that he is to keep strengthening over the next few weeks.      Objective: See treatment diary below      Assessment: Tolerated treatment well. Patient continues to progress with plantar flexion and dorsiflexion strength, though dorsiflexors continue to be the greatest limitation.  Patient demonstrated fatigue post treatment, exhibited good technique with therapeutic exercises, and would benefit from continued PT      Plan: Continue per plan of care.  Progress treatment as tolerated.  Progress challenge as appropriate with irritability.     Precautions:     Precautions:   Past Medical History:   Diagnosis Date    Patient denies medical problems     Tinnitus of both ears         Insurance:  [employer]/CMS Eval/ Re-eval Auth #/ Referral # Total units or visits Start date  Expiration date KX? Visit limitation?  PT only or  PT+OT? Co-Insurance   CMS 25                      POC Start Date POC Expiration Date Signed POC?   25 signed   3/13/25 5/11/25 pend      Date 2/19 2/24 2/27 3/3 3/6 3/10 3/13 3/17 3/20 3/24 3/27    Visits/Units:  Used 1 2 3 4 5 6 7 8 9 10 11    Authed:  Remaining                  [employer]:  Access Code: [email or web address]  URL: [email or web address]  Date: 2025  Prepared by: Garrett Lema    Exercises  - Long Sitting Ankle Eversion with Resistance  - 2 x daily - 7 x weekly - 3 sets - 10 reps  - Long Sitting Ankle Plantar Flexion with Resistance  - 2 x daily - 7 x weekly - 3 sets - 10  "reps  - Long Sitting Ankle Inversion with Resistance  - 2 x daily - 7 x weekly - 3 sets - 10 reps  - Long Sitting Ankle Dorsiflexion with Anchored Resistance  - 2 x daily - 7 x weekly - 3 sets - 10 reps  - Long Sitting Calf Stretch with Strap  - 2 x daily - 7 x weekly - 1 sets - 10 reps - 10 hold      Manuals 3/27 3/24 3/20 3/17 3/13 3/10 3/6 3/3 2/27 2/24 2/19   L Ankle PROM BR performed all directions BR performed all directions BR performed all directions BR performed all directions BR performed all directions BR performed all directions   BR performed w/ effleurage  BR performed                                              Neuro Re-Ed              Ankle 4-way           HEP   Heel Raises     2x10 on slantboard 2x10 on slantboard 2x 2x10 on slantboard 2x10 on slantboard 2x10 2x10 B/L    SLS Fwd airex 2x10 Fwd airex 2x10 Star excursion 2x10 Star excursion 2x10   Star excursion 2x10 Airex 2x10 multi-directional movements Airex 10x10\" Airex 10x10\"    SL RDL [place]  4.0 2x10    Luther SL 4.0 2x10 [place] 4.0 2x10     2x10  2x10     Sliders        Anterior slide 2x10      Lat Step Downs 10x L8 step downs 10x off step      L6 2x10      Dorsiflexion for Repetitions 2x10    Seated 2x10 w/ incline board 10x 3x10 standing with rail UE support 3x10 w/ BTB  2x10        Ther Ex              Dorsiflexion Str     10x10\" sitting on chair      HEP   Assessment & Management POC with emphasis on dorsiflexor strengthening. Reassess and POC with stabilization and strengthening.  POC with continued repetitions with dorsiflexor strengthening.  POC with emphasis on continued emphasis on repeated soleus stretch POC with emphasis on frequent mobility exercise and standing exercises.    POC with emphasis on early ROM with gradual strengthening and stabilization training.   Gastroc Str          10x10\" standing    Soleus Str     Slantboard 10x10\" Slantboard 10x10\" Slantboard 10x10\"  5x10\" standing 10x10\" standing      Leg Press  Calf Press " 4x10 110# Calf press 4x10 110# Calf press 4x10 100# Calf press 4x10 85# Calf Press 3x10 85#  Calf Press 3x10 85# Calf Press 3x10 80# Calf Press  2x10 70# 2x10 Calf Press 2x10 55# into stretch    Step Downs        L6 overs 2x10 L6 2x10 over cone 10x    Lunge PF   10x w/ manual OP light 2x10 w/ PT manual OP light 2x10 w/ PT manual OP light OP 3x10 w/ PT manual OP for dorsiflexion light pressure 2x10       SL Squat w/ TRX  2x10            Ther Activity                                          Gait Training                                            Modalities

## 2025-03-31 ENCOUNTER — OFFICE VISIT (OUTPATIENT)
Dept: PHYSICAL THERAPY | Facility: CLINIC | Age: 55
End: 2025-03-31
Payer: COMMERCIAL

## 2025-03-31 DIAGNOSIS — Z98.890 S/P ORIF (OPEN REDUCTION INTERNAL FIXATION) FRACTURE: ICD-10-CM

## 2025-03-31 DIAGNOSIS — Z48.89 AFTERCARE FOLLOWING SURGERY: ICD-10-CM

## 2025-03-31 DIAGNOSIS — S82.892D CLOSED FRACTURE OF LEFT ANKLE WITH ROUTINE HEALING, SUBSEQUENT ENCOUNTER: Primary | ICD-10-CM

## 2025-03-31 DIAGNOSIS — Z87.81 S/P ORIF (OPEN REDUCTION INTERNAL FIXATION) FRACTURE: ICD-10-CM

## 2025-03-31 PROCEDURE — 97112 NEUROMUSCULAR REEDUCATION: CPT | Performed by: PHYSICAL THERAPIST

## 2025-03-31 PROCEDURE — 97140 MANUAL THERAPY 1/> REGIONS: CPT | Performed by: PHYSICAL THERAPIST

## 2025-03-31 NOTE — PROGRESS NOTES
Daily Note     Today's date: 3/31/2025  Patient name: Levi Jin  : 1970  MRN: [id number]  Referring provider: Sanjay Jackson*  Dx:   Encounter Diagnosis     ICD-10-CM    1. Closed fracture of left ankle with routine healing, subsequent encounter  S82.892D       2. Aftercare following surgery  Z48.89       3. S/P ORIF (open reduction internal fixation) fracture  Z98.890     Z87.81                      Subjective: Patient reports he is feeling about the same overall, though he continues to have significant stiffness in the morning.      Objective: See treatment diary below      Assessment: Tolerated treatment well. Patient demonstrates improved dorsiflexion strength against gravity with WB.  Additionally, he continues to have lateral ankle and rotational deficits.  Patient demonstrated fatigue post treatment, exhibited good technique with therapeutic exercises, and would benefit from continued PT      Plan: Continue per plan of care.  Progress treatment as tolerated.  Progress challenge as appropriate with irritability.     Precautions:     Precautions:   Past Medical History:   Diagnosis Date    Patient denies medical problems     Tinnitus of both ears         Insurance:  [employer]/CMS Eval/ Re-eval Auth #/ Referral # Total units or visits Start date  Expiration date KX? Visit limitation?  PT only or  PT+OT? Co-Insurance   CMS 25     CMS 3/24/25   4/3/25 7/3/25  12       POC Start Date POC Expiration Date Signed POC?   25 signed   3/13/25 5/11/25 pend      Date 2/19 2/24 2/27 3/3 3/6 3/10 3/13 3/17 3/20 3/24 3/27 3/31   Visits/Units:  Used 1 2 3 4 5 6 7 8 9 10 11 12   Authed:  Remaining                  [employer]:  Access Code: [email or web address]  URL: [email or web address]  Date: 2025  Prepared by: Garrett Lema    Exercises  - Long Sitting Ankle Eversion with Resistance  - 2 x daily - 7 x weekly - 3 sets - 10 reps  - Long Sitting Ankle Plantar Flexion with  "Resistance  - 2 x daily - 7 x weekly - 3 sets - 10 reps  - Long Sitting Ankle Inversion with Resistance  - 2 x daily - 7 x weekly - 3 sets - 10 reps  - Long Sitting Ankle Dorsiflexion with Anchored Resistance  - 2 x daily - 7 x weekly - 3 sets - 10 reps  - Long Sitting Calf Stretch with Strap  - 2 x daily - 7 x weekly - 1 sets - 10 reps - 10 hold      Manuals 3/31 3/27 3/24 3/20 3/17 3/13 3/10 3/6 3/3 2/27 2/24 2/19   L Ankle PROM BR performed all directions BR performed all directions BR performed all directions BR performed all directions BR performed all directions BR performed all directions BR performed all directions   BR performed w/ effleurage  BR performed                                                 Neuro Re-Ed               Ankle 4-way            HEP   Heel Raises      2x10 on slantboard 2x10 on slantboard 2x 2x10 on slantboard 2x10 on slantboard 2x10 2x10 B/L    SLS  Fwd airex 2x10 Fwd airex 2x10 Star excursion 2x10 Star excursion 2x10   Star excursion 2x10 Airex 2x10 multi-directional movements Airex 10x10\" Airex 10x10\"    SL RDL Luther 4.0 2x10    [place] SL 4.0 ea direction 2x10 [place]  4.0 2x10    [place] SL 4.0 2x10 [place] 4.0 2x10     2x10  2x10     Sliders         Anterior slide 2x10      BOSU Lunge w/ PF 3x10              Lat Step Downs  10x L8 step downs 10x off step      L6 2x10      Dorsiflexion for Repetitions  2x10    Seated 2x10 w/ incline board 10x 3x10 standing with rail UE support 3x10 w/ BTB  2x10        Ther Ex               Dorsiflexion Str      10x10\" sitting on chair      HEP   Assessment & Management  POC with emphasis on dorsiflexor strengthening. Reassess and POC with stabilization and strengthening.  POC with continued repetitions with dorsiflexor strengthening.  POC with emphasis on continued emphasis on repeated soleus stretch POC with emphasis on frequent mobility exercise and standing exercises.    POC with emphasis on early ROM with gradual strengthening and stabilization " "training.   Gastroc Str           10x10\" standing    Soleus Str      Slantboard 10x10\" Slantboard 10x10\" Slantboard 10x10\"  5x10\" standing 10x10\" standing      Leg Press  Calf Press 4x10 110# Calf Press 4x10 110# Calf press 4x10 110# Calf press 4x10 100# Calf press 4x10 85# Calf Press 3x10 85#  Calf Press 3x10 85# Calf Press 3x10 80# Calf Press  2x10 70# 2x10 Calf Press 2x10 55# into stretch    Step Downs         L6 overs 2x10 L6 2x10 over cone 10x    Lunge PF    10x w/ manual OP light 2x10 w/ PT manual OP light 2x10 w/ PT manual OP light OP 3x10 w/ PT manual OP for dorsiflexion light pressure 2x10       SL Squat w/ TRX   2x10            Ther Activity                                             Gait Training                                               Modalities                                                      "

## 2025-04-03 ENCOUNTER — OFFICE VISIT (OUTPATIENT)
Dept: PHYSICAL THERAPY | Facility: CLINIC | Age: 55
End: 2025-04-03
Payer: COMMERCIAL

## 2025-04-03 DIAGNOSIS — S82.892D CLOSED FRACTURE OF LEFT ANKLE WITH ROUTINE HEALING, SUBSEQUENT ENCOUNTER: Primary | ICD-10-CM

## 2025-04-03 DIAGNOSIS — Z87.81 S/P ORIF (OPEN REDUCTION INTERNAL FIXATION) FRACTURE: ICD-10-CM

## 2025-04-03 DIAGNOSIS — Z48.89 AFTERCARE FOLLOWING SURGERY: ICD-10-CM

## 2025-04-03 DIAGNOSIS — Z98.890 S/P ORIF (OPEN REDUCTION INTERNAL FIXATION) FRACTURE: ICD-10-CM

## 2025-04-03 PROCEDURE — 97110 THERAPEUTIC EXERCISES: CPT | Performed by: PHYSICAL THERAPIST

## 2025-04-03 PROCEDURE — 97112 NEUROMUSCULAR REEDUCATION: CPT | Performed by: PHYSICAL THERAPIST

## 2025-04-03 NOTE — PROGRESS NOTES
Daily Note     Today's date: 4/3/2025  Patient name: Levi Jin  : 1970  MRN: [id number]  Referring provider: Sanjay Jackson*  Dx:   Encounter Diagnosis     ICD-10-CM    1. Closed fracture of left ankle with routine healing, subsequent encounter  S82.892D       2. Aftercare following surgery  Z48.89       3. S/P ORIF (open reduction internal fixation) fracture  Z98.890     Z87.81                      Subjective: Patient reports he feels his ankle has been tightening up in the last few weeks.      Objective: See treatment diary below      Assessment: Tolerated treatment well. Emphasized dorsiflexion ROM with posterior glide of the talus.  ROM improved, emphasized increase in stabilization exercises with his home exercises.  Patient demonstrated fatigue post treatment, exhibited good technique with therapeutic exercises, and would benefit from continued PT      Plan: Continue per plan of care.  Progress treatment as tolerated.  Progress challenge as appropriate with irritability.     Precautions:     Precautions:   Past Medical History:   Diagnosis Date    Patient denies medical problems     Tinnitus of both ears         Insurance:  A/CMS Eval/ Re-eval Auth #/ Referral # Total units or visits Start date  Expiration date KX? Visit limitation?  PT only or  PT+OT? Co-Insurance   CMS 25   12     CMS 3/24/25   4/3/25 7/3/25  12       POC Start Date POC Expiration Date Signed POC?   25 signed   3/13/25 5/11/25 pend      Date 2/19 2/24 2/27 3/3 3/6 3/10 3/13 3/17 3/20 3/24 3/27 3/31 4   Visits/Units:  Used 1 2 3 4 5 6 7 8 9 10 11 12 13   Authed:  Remaining                   [employer]:  Access Code: [email or web address]  URL: [email or web address]  Date: 2025  Prepared by: Garrett Lema    Exercises  - Long Sitting Ankle Eversion with Resistance  - 2 x daily - 7 x weekly - 3 sets - 10 reps  - Long Sitting Ankle Plantar Flexion with Resistance  - 2 x daily - 7 x weekly  "- 3 sets - 10 reps  - Long Sitting Ankle Inversion with Resistance  - 2 x daily - 7 x weekly - 3 sets - 10 reps  - Long Sitting Ankle Dorsiflexion with Anchored Resistance  - 2 x daily - 7 x weekly - 3 sets - 10 reps  - Long Sitting Calf Stretch with Strap  - 2 x daily - 7 x weekly - 1 sets - 10 reps - 10 hold      Manuals 4/2 3/31 3/27 3/24 3/20 3/17 3/13 3/10 3/6 3/3 2/27 2/24 2/19   L Ankle PROM BR performed all directions BR performed all directions BR performed all directions BR performed all directions BR performed all directions BR performed all directions BR performed all directions BR performed all directions   BR performed w/ effleurage  BR performed                                                    Neuro Re-Ed                Ankle 4-way             HEP   Heel Raises       2x10 on slantboard 2x10 on slantboard 2x 2x10 on slantboard 2x10 on slantboard 2x10 2x10 B/L    SLS   Fwd airex 2x10 Fwd airex 2x10 Star excursion 2x10 Star excursion 2x10   Star excursion 2x10 Airex 2x10 multi-directional movements Airex 10x10\" Airex 10x10\"    SL RDL [place] 4.0 2x10 pallof [place] 4.0 2x10    [place] SL 4.0 ea direction 2x10 [place]  4.0 2x10    [place] SL 4.0 2x10 [place] 4.0 2x10     2x10  2x10     Sliders          Anterior slide 2x10      BOSU  Lunge w/ PF 3x10              Lat Step Downs L8 2x10  10x L8 step downs 10x off step      L6 2x10      Dorsiflexion for Repetitions   2x10    Seated 2x10 w/ incline board 10x 3x10 standing with rail UE support 3x10 w/ BTB  2x10        Ther Ex                Dorsiflexion Str       10x10\" sitting on chair      HEP   Assessment & Management   POC with emphasis on dorsiflexor strengthening. Reassess and POC with stabilization and strengthening.  POC with continued repetitions with dorsiflexor strengthening.  POC with emphasis on continued emphasis on repeated soleus stretch POC with emphasis on frequent mobility exercise and standing exercises.    POC with emphasis on early ROM with " "gradual strengthening and stabilization training.   Gastroc Str            10x10\" standing    Soleus Str       Slantboard 10x10\" Slantboard 10x10\" Slantboard 10x10\"  5x10\" standing 10x10\" standing      Leg Press  Calf Press 4x10 110# Calf Press 4x10 110# Calf Press 4x10 110# Calf press 4x10 110# Calf press 4x10 100# Calf press 4x10 85# Calf Press 3x10 85#  Calf Press 3x10 85# Calf Press 3x10 80# Calf Press  2x10 70# 2x10 Calf Press 2x10 55# into stretch    Step Downs          L6 overs 2x10 L6 2x10 over cone 10x    Lunge PF 2x10 w/ manual OP    10x w/ manual OP light 2x10 w/ PT manual OP light 2x10 w/ PT manual OP light OP 3x10 w/ PT manual OP for dorsiflexion light pressure 2x10       SL Squat w/ TRX Biodex Lvl 10 2x10   2x10            Ther Activity                                                Gait Training                                                  Modalities                                                           "

## 2025-04-07 ENCOUNTER — OFFICE VISIT (OUTPATIENT)
Dept: PHYSICAL THERAPY | Facility: CLINIC | Age: 55
End: 2025-04-07
Payer: COMMERCIAL

## 2025-04-07 DIAGNOSIS — Z87.81 S/P ORIF (OPEN REDUCTION INTERNAL FIXATION) FRACTURE: ICD-10-CM

## 2025-04-07 DIAGNOSIS — Z98.890 S/P ORIF (OPEN REDUCTION INTERNAL FIXATION) FRACTURE: ICD-10-CM

## 2025-04-07 DIAGNOSIS — S82.892D CLOSED FRACTURE OF LEFT ANKLE WITH ROUTINE HEALING, SUBSEQUENT ENCOUNTER: Primary | ICD-10-CM

## 2025-04-07 DIAGNOSIS — Z48.89 AFTERCARE FOLLOWING SURGERY: ICD-10-CM

## 2025-04-07 PROCEDURE — 97112 NEUROMUSCULAR REEDUCATION: CPT | Performed by: PHYSICAL THERAPIST

## 2025-04-07 PROCEDURE — 97140 MANUAL THERAPY 1/> REGIONS: CPT | Performed by: PHYSICAL THERAPIST

## 2025-04-07 PROCEDURE — 97110 THERAPEUTIC EXERCISES: CPT | Performed by: PHYSICAL THERAPIST

## 2025-04-07 NOTE — PROGRESS NOTES
Daily Note     Today's date: 2025  Patient name: Levi Jin  : 1970  MRN: [id number]  Referring provider: Sanjay Jackson*  Dx:   Encounter Diagnosis     ICD-10-CM    1. Closed fracture of left ankle with routine healing, subsequent encounter  S82.892D       2. Aftercare following surgery  Z48.89       3. S/P ORIF (open reduction internal fixation) fracture  Z98.890     Z87.81                      Subjective: Patient reports he feels about the same overall, he continues to have stiffness globally around the ring of the ankle.      Objective: See treatment diary below      Assessment: Tolerated treatment well. Continued to emphasize dorsiflexion with different accessory motions of the tibia in order to reduce anterior ankle pain.  He continues to have weakness with dorsiflexors.  Patient demonstrated fatigue post treatment, exhibited good technique with therapeutic exercises, and would benefit from continued PT      Plan: Continue per plan of care.  Progress treatment as tolerated.  Progress challenge as appropriate with irritability.     Precautions:     Precautions:   Past Medical History:   Diagnosis Date    Patient denies medical problems     Tinnitus of both ears         Insurance:  A/CMS Eval/ Re-eval Auth #/ Referral # Total units or visits Start date  Expiration date KX? Visit limitation?  PT only or  PT+OT? Co-Insurance   CMS 25   12     CMS 3/24/25   4/3/25 7/3/25  12       POC Start Date POC Expiration Date Signed POC?   25 signed   3/13/25 5/11/25 pend      Date 2/19 2/24 2/27 3/3 3/6 3/10 3/13 3/17 3/20 3/24 3/27 3/31 4/7 4   Visits/Units:  Used 1 2 3 4 5 6 7 8 9 10 11 12 13 13   Authed:  Remaining                    [employer]:  Access Code: [email or web address]  URL: [email or web address]  Date: 2025  Prepared by: Garrett Lema    Exercises  - Long Sitting Ankle Eversion with Resistance  - 2 x daily - 7 x weekly - 3 sets - 10 reps  - Long  "Sitting Ankle Plantar Flexion with Resistance  - 2 x daily - 7 x weekly - 3 sets - 10 reps  - Long Sitting Ankle Inversion with Resistance  - 2 x daily - 7 x weekly - 3 sets - 10 reps  - Long Sitting Ankle Dorsiflexion with Anchored Resistance  - 2 x daily - 7 x weekly - 3 sets - 10 reps  - Long Sitting Calf Stretch with Strap  - 2 x daily - 7 x weekly - 1 sets - 10 reps - 10 hold      Manuals 4/7 4/2 3/31 3/27 3/24 3/20 3/17 3/13 3/10 3/6 3/3 2/27 2/24 2/19   L Ankle PROM BR performed all directions BR performed all directions BR performed all directions BR performed all directions BR performed all directions BR performed all directions BR performed all directions BR performed all directions BR performed all directions   BR performed w/ effleurage  BR performed                                                       Neuro Re-Ed                 Ankle 4-way              HEP   Heel Raises        2x10 on slantboard 2x10 on slantboard 2x 2x10 on slantboard 2x10 on slantboard 2x10 2x10 B/L    SLS    Fwd airex 2x10 Fwd airex 2x10 Star excursion 2x10 Star excursion 2x10   Star excursion 2x10 Airex 2x10 multi-directional movements Airex 10x10\" Airex 10x10\"    SL RDL [place] 4.0 2x10 pallof  [place] 4.0 2x10 pallof [place] 4.0 2x10    [place] SL 4.0 ea direction 2x10 [place]  4.0 2x10    [place] SL 4.0 2x10 [place] 4.0 2x10     2x10  2x10     Sliders           Anterior slide 2x10      BOSU   Lunge w/ PF 3x10              Lat Step Downs L8 3x10 L8 2x10  10x L8 step downs 10x off step      L6 2x10      Dorsiflexion for Repetitions    2x10    Seated 2x10 w/ incline board 10x 3x10 standing with rail UE support 3x10 w/ BTB  2x10        Ther Ex                 Dorsiflexion Str        10x10\" sitting on chair      HEP   Assessment & Management POC with continued emphasis on strengthening and ROM   POC with emphasis on dorsiflexor strengthening. Reassess and POC with stabilization and strengthening.  POC with continued repetitions with " "dorsiflexor strengthening.  POC with emphasis on continued emphasis on repeated soleus stretch POC with emphasis on frequent mobility exercise and standing exercises.    POC with emphasis on early ROM with gradual strengthening and stabilization training.   Gastroc Str             10x10\" standing    Soleus Str        Slantboard 10x10\" Slantboard 10x10\" Slantboard 10x10\"  5x10\" standing 10x10\" standing      Leg Press  Calf Press 4x10 110# Calf Press 4x10 110# Calf Press 4x10 110# Calf Press 4x10 110# Calf press 4x10 110# Calf press 4x10 100# Calf press 4x10 85# Calf Press 3x10 85#  Calf Press 3x10 85# Calf Press 3x10 80# Calf Press  2x10 70# 2x10 Calf Press 2x10 55# into stretch    Step Downs           L6 overs 2x10 L6 2x10 over cone 10x    Lunge PF 2x10 w/ manual OP    2x with IR/ER ea 2x10 w/ manual OP    10x w/ manual OP light 2x10 w/ PT manual OP light 2x10 w/ PT manual OP light OP 3x10 w/ PT manual OP for dorsiflexion light pressure 2x10       SL Squat w/ TRX  Biodex Lvl 10 2x10   2x10            Ther Activity                                                   Gait Training                                                     Modalities                                                                "

## 2025-04-08 ENCOUNTER — TELEPHONE (OUTPATIENT)
Dept: OBGYN CLINIC | Facility: CLINIC | Age: 55
End: 2025-04-08

## 2025-04-08 NOTE — TELEPHONE ENCOUNTER
----- Message from Sanjay Jackson MD sent at 4/8/2025 10:54 AM EDT -----  His job was not able to accommodate these restrictions. He remains out currently due to ongoing restrictions. Anticipate return to full duty 4-6 months after injury, which may be after the next visit at 4.5 months  ----- Message -----  From: Jenni Lake MA  Sent: 4/8/2025  10:30 AM EDT  To: Sanjay Jackson MD; #    Please confirm if patient went back as per letter placed 01/07 s/p 2 weeks for desk duty, restrictions being no weightbearing on left lower extremity,lifting, pushing, pulling, carrying. Anticipated return to walking standing at status post 8 weeks. Anticipated return to work full duty with no restrictions status post 3-4 months  ----- Message -----  From: Merline Oreilly  Sent: 4/8/2025  10:02 AM EDT  To: Alexander Aguero

## 2025-04-10 ENCOUNTER — OFFICE VISIT (OUTPATIENT)
Dept: PHYSICAL THERAPY | Facility: CLINIC | Age: 55
End: 2025-04-10
Payer: COMMERCIAL

## 2025-04-10 DIAGNOSIS — Z87.81 S/P ORIF (OPEN REDUCTION INTERNAL FIXATION) FRACTURE: ICD-10-CM

## 2025-04-10 DIAGNOSIS — Z48.89 AFTERCARE FOLLOWING SURGERY: ICD-10-CM

## 2025-04-10 DIAGNOSIS — Z98.890 S/P ORIF (OPEN REDUCTION INTERNAL FIXATION) FRACTURE: ICD-10-CM

## 2025-04-10 DIAGNOSIS — S82.892D CLOSED FRACTURE OF LEFT ANKLE WITH ROUTINE HEALING, SUBSEQUENT ENCOUNTER: Primary | ICD-10-CM

## 2025-04-10 PROCEDURE — 97110 THERAPEUTIC EXERCISES: CPT | Performed by: PHYSICAL THERAPIST

## 2025-04-10 PROCEDURE — 97112 NEUROMUSCULAR REEDUCATION: CPT | Performed by: PHYSICAL THERAPIST

## 2025-04-10 PROCEDURE — 97140 MANUAL THERAPY 1/> REGIONS: CPT | Performed by: PHYSICAL THERAPIST

## 2025-04-10 NOTE — PROGRESS NOTES
Daily Note     Today's date: 4/10/2025  Patient name: Levi Jin  : 1970  MRN: [id number]  Referring provider: Sanjay Jackson*  Dx:   Encounter Diagnosis     ICD-10-CM    1. Closed fracture of left ankle with routine healing, subsequent encounter  S82.892D       2. Aftercare following surgery  Z48.89       3. S/P ORIF (open reduction internal fixation) fracture  Z98.890     Z87.81                      Subjective: Patient reports he continues to feel a block in his ankle and he is unable to get the last 10-20% of motion.      Objective: See treatment diary below      Assessment: Tolerated treatment fair. Patient continues to respond well to posterior glide of the talus with loaded dorsiflexion, though he continues to have limited anterior glide of the talus which is likely contributing to plantar flexion limitation.  This demonstrates functional limitations with step down performance with apparent vaulting maneuver and weakness/limitation with single leg calf raise.  Patient demonstrated fatigue post treatment, exhibited good technique with therapeutic exercises, and would benefit from continued PT      Plan: Continue per plan of care.  Progress treatment as tolerated.  Progress challenge as appropriate with irritability.     Precautions:     Precautions:   Past Medical History:   Diagnosis Date    Patient denies medical problems     Tinnitus of both ears         Insurance:  AMA/CMS Eval/ Re-eval Auth #/ Referral # Total units or visits Start date  Expiration date KX? Visit limitation?  PT only or  PT+OT? Co-Insurance   CMS 25   12     CMS 3/24/25   4/3/25 7/3/25  12       POC Start Date POC Expiration Date Signed POC?   25 signed   3/13/25 5/11/25 pend      Date 2/19 2/24 2/27 3/3 3/6 3/10 3/13 3/17 3/20 3/24 3/27 3/31 4/7 4/9   Visits/Units:  Used 1 2 3 4 5 6 7 8 9 10 11 12 13 14   Authed:  Remaining                    [employer]:  Access Code: [email or web address]  URL:  [email or web address]  Date: 02/19/2025  Prepared by: Garrett Lema    Exercises  - Long Sitting Ankle Eversion with Resistance  - 2 x daily - 7 x weekly - 3 sets - 10 reps  - Long Sitting Ankle Plantar Flexion with Resistance  - 2 x daily - 7 x weekly - 3 sets - 10 reps  - Long Sitting Ankle Inversion with Resistance  - 2 x daily - 7 x weekly - 3 sets - 10 reps  - Long Sitting Ankle Dorsiflexion with Anchored Resistance  - 2 x daily - 7 x weekly - 3 sets - 10 reps  - Long Sitting Calf Stretch with Strap  - 2 x daily - 7 x weekly - 1 sets - 10 reps - 10 hold      Manuals 4/9 4/7 4/2 3/31 3/27 3/24 3/20 3/17 3/13 3/10 3/6 3/3 2/27 2/24 2/19   L Ankle PROM BR performed all directions BR performed all directions BR performed all directions BR performed all directions BR performed all directions BR performed all directions BR performed all directions BR performed all directions BR performed all directions BR performed all directions   BR performed w/ effleurage  BR performed                                                          Neuro Re-Ed                  Ankle 4-way               HEP   Heel Raises         2x10 on slantboard 2x10 on slantboard 2x 2x10 on slantboard 2x10 on slantboard 2x10 2x10 B/L    SLS     Fwd airex 2x10 Fwd airex 2x10 Star excursion 2x10 Star excursion 2x10   Star excursion 2x10 Airex 2x10 multi-directional movements Airex 10x10\" Airex 10x10\"    SL RDL  [place] 4.0 2x10 pallof  [place] 4.0 2x10 pallof [place] 4.0 2x10    [place] SL 4.0 ea direction 2x10 [place]  4.0 2x10    [place] SL 4.0 2x10 [place] 4.0 2x10     2x10  2x10     Sliders            Anterior slide 2x10      BOSU    Lunge w/ PF 3x10              Lat Step Downs L8 w/ lateral movement for stability challenge L8 3x10 L8 2x10  10x L8 step downs 10x off step      L6 2x10      Dorsiflexion for Repetitions     2x10    Seated 2x10 w/ incline board 10x 3x10 standing with rail UE support 3x10 w/ BTB  2x10        Ther Ex                " "  Dorsiflexion Str         10x10\" sitting on chair      HEP   Assessment & Management POC with increased emphasis on plantar flexion ROM.   POC with continued emphasis on strengthening and ROM   POC with emphasis on dorsiflexor strengthening. Reassess and POC with stabilization and strengthening.  POC with continued repetitions with dorsiflexor strengthening.  POC with emphasis on continued emphasis on repeated soleus stretch POC with emphasis on frequent mobility exercise and standing exercises.    POC with emphasis on early ROM with gradual strengthening and stabilization training.   Gastroc Str              10x10\" standing    Soleus Str         Slantboard 10x10\" Slantboard 10x10\" Slantboard 10x10\"  5x10\" standing 10x10\" standing      Leg Press  Calf Press 4x10 110# Calf Press 4x10 110# Calf Press 4x10 110# Calf Press 4x10 110# Calf Press 4x10 110# Calf press 4x10 110# Calf press 4x10 100# Calf press 4x10 85# Calf Press 3x10 85#  Calf Press 3x10 85# Calf Press 3x10 80# Calf Press  2x10 70# 2x10 Calf Press 2x10 55# into stretch    Step Downs            L6 overs 2x10 L6 2x10 over cone 10x    Lunge PF 2x10 w/ manual OP w/ ER of tibia 2x10 w/ manual OP    2x with IR/ER ea 2x10 w/ manual OP    10x w/ manual OP light 2x10 w/ PT manual OP light 2x10 w/ PT manual OP light OP 3x10 w/ PT manual OP for dorsiflexion light pressure 2x10       SL Squat w/ TRX   Biodex Lvl 10 2x10   2x10            Ther Activity                                                      Gait Training                                                        Modalities                                                                     "

## 2025-04-14 ENCOUNTER — OFFICE VISIT (OUTPATIENT)
Dept: PHYSICAL THERAPY | Facility: CLINIC | Age: 55
End: 2025-04-14
Payer: COMMERCIAL

## 2025-04-14 DIAGNOSIS — Z48.89 AFTERCARE FOLLOWING SURGERY: ICD-10-CM

## 2025-04-14 DIAGNOSIS — Z87.81 S/P ORIF (OPEN REDUCTION INTERNAL FIXATION) FRACTURE: ICD-10-CM

## 2025-04-14 DIAGNOSIS — S82.892D CLOSED FRACTURE OF LEFT ANKLE WITH ROUTINE HEALING, SUBSEQUENT ENCOUNTER: Primary | ICD-10-CM

## 2025-04-14 DIAGNOSIS — Z98.890 S/P ORIF (OPEN REDUCTION INTERNAL FIXATION) FRACTURE: ICD-10-CM

## 2025-04-14 PROCEDURE — 97110 THERAPEUTIC EXERCISES: CPT | Performed by: PHYSICAL THERAPIST

## 2025-04-14 PROCEDURE — 97112 NEUROMUSCULAR REEDUCATION: CPT | Performed by: PHYSICAL THERAPIST

## 2025-04-14 PROCEDURE — 97140 MANUAL THERAPY 1/> REGIONS: CPT | Performed by: PHYSICAL THERAPIST

## 2025-04-14 NOTE — PROGRESS NOTES
Daily Note     Today's date: 2025  Patient name: Levi Jin  : 1970  MRN: [id number]  Referring provider: Sanjay Jackson*  Dx:   Encounter Diagnosis     ICD-10-CM    1. Closed fracture of left ankle with routine healing, subsequent encounter  S82.892D       2. Aftercare following surgery  Z48.89       3. S/P ORIF (open reduction internal fixation) fracture  Z98.890     Z87.81                      Subjective: Patient reports he continues to have significant stiffness in the morning that takes a while for him to work out.      Objective: See treatment diary below      Assessment: Tolerated treatment well. Patient continues to progress with plantar flexion strength, though he continues to have consistent limitation with end range plantar and dorsiflexion ROM.  Emphasized continued adherence to mobility and strengthening exercises with HEP.  Patient demonstrated fatigue post treatment, exhibited good technique with therapeutic exercises, and would benefit from continued PT      Plan: Continue per plan of care.  Progress treatment as tolerated.  Progress challenge as appropriate with irritability.     Precautions:     Precautions:   Past Medical History:   Diagnosis Date    Patient denies medical problems     Tinnitus of both ears         Insurance:  A/CMS Eval/ Re-eval Auth #/ Referral # Total units or visits Start date  Expiration date KX? Visit limitation?  PT only or  PT+OT? Co-Insurance   CMS 25   12     CMS 3/24/25   4/3/25 7/3/25  12       POC Start Date POC Expiration Date Signed POC?   25 signed   3/13/25 5/11/25 pend      Date  3/3 3/6 3/10 3/13 3/17 3/20 3/24 3/27 3/31 4/7 4/9 4/14   Visits/Units:  Used 1 2 3 4 5 6 7 8 9 10 11 12 13 14 15   Authed:  Remaining                     [employer]:  Access Code: [email or web address]  URL: [email or web address]  Date: 2025  Prepared by: Garrett Lema    Exercises  - Long Sitting Ankle Eversion  "with Resistance  - 2 x daily - 7 x weekly - 3 sets - 10 reps  - Long Sitting Ankle Plantar Flexion with Resistance  - 2 x daily - 7 x weekly - 3 sets - 10 reps  - Long Sitting Ankle Inversion with Resistance  - 2 x daily - 7 x weekly - 3 sets - 10 reps  - Long Sitting Ankle Dorsiflexion with Anchored Resistance  - 2 x daily - 7 x weekly - 3 sets - 10 reps  - Long Sitting Calf Stretch with Strap  - 2 x daily - 7 x weekly - 1 sets - 10 reps - 10 hold      Manuals 4/14 4/9 4/7 4/2 3/31 3/27 3/24 3/20 3/17 3/13 3/10 3/6 3/3 2/27 2/24 2/19   L Ankle PROM BR performed all directions BR performed all directions BR performed all directions BR performed all directions BR performed all directions BR performed all directions BR performed all directions BR performed all directions BR performed all directions BR performed all directions BR performed all directions   BR performed w/ effleurage  BR performed                                                             Neuro Re-Ed                   Ankle 4-way                HEP   Heel Raises          2x10 on slantboard 2x10 on slantboard 2x 2x10 on slantboard 2x10 on slantboard 2x10 2x10 B/L    SLS      Fwd airex 2x10 Fwd airex 2x10 Star excursion 2x10 Star excursion 2x10   Star excursion 2x10 Airex 2x10 multi-directional movements Airex 10x10\" Airex 10x10\"    SL RDL [place] 4.0 2x10 ea way pallof  [place] 4.0 2x10 pallof  [place] 4.0 2x10 pallof [place] 4.0 2x10    [place] SL 4.0 ea direction 2x10 [place]  4.0 2x10    [place] SL 4.0 2x10 [place] 4.0 2x10     2x10  2x10     Sliders             Anterior slide 2x10      BOSU Lunge w/ PF 2x10    Lunge w/ PF 3x10              Lat Step Downs  L8 w/ lateral movement for stability challenge L8 3x10 L8 2x10  10x L8 step downs 10x off step      L6 2x10      Dorsiflexion for Repetitions      2x10    Seated 2x10 w/ incline board 10x 3x10 standing with rail UE support 3x10 w/ BTB  2x10        Ther Ex                   Dorsiflexion Str          10x10\" " "sitting on chair      HEP   Assessment & Management POC with continued emphasis on HEP with mobility and strengthening. POC with increased emphasis on plantar flexion ROM.   POC with continued emphasis on strengthening and ROM   POC with emphasis on dorsiflexor strengthening. Reassess and POC with stabilization and strengthening.  POC with continued repetitions with dorsiflexor strengthening.  POC with emphasis on continued emphasis on repeated soleus stretch POC with emphasis on frequent mobility exercise and standing exercises.    POC with emphasis on early ROM with gradual strengthening and stabilization training.   Gastroc Str               10x10\" standing    Soleus Str          Slantboard 10x10\" Slantboard 10x10\" Slantboard 10x10\"  5x10\" standing 10x10\" standing      Leg Press  Calf Press 4x10 110# Calf Press 4x10 110# Calf Press 4x10 110# Calf Press 4x10 110# Calf Press 4x10 110# Calf Press 4x10 110# Calf press 4x10 110# Calf press 4x10 100# Calf press 4x10 85# Calf Press 3x10 85#  Calf Press 3x10 85# Calf Press 3x10 80# Calf Press  2x10 70# 2x10 Calf Press 2x10 55# into stretch    Step Downs             L6 overs 2x10 L6 2x10 over cone 10x    Lunge PF 2x10 w/ manual OP w/ ER of tibia 2x10 w/ manual OP w/ ER of tibia 2x10 w/ manual OP    2x with IR/ER ea 2x10 w/ manual OP    10x w/ manual OP light 2x10 w/ PT manual OP light 2x10 w/ PT manual OP light OP 3x10 w/ PT manual OP for dorsiflexion light pressure 2x10       SL Squat w/ TRX    Biodex Lvl 10 2x10   2x10            Ther Activity                                                         Gait Training                                                           Modalities                                                                          "

## 2025-04-17 ENCOUNTER — OFFICE VISIT (OUTPATIENT)
Dept: PHYSICAL THERAPY | Facility: CLINIC | Age: 55
End: 2025-04-17
Payer: COMMERCIAL

## 2025-04-17 DIAGNOSIS — S82.892D CLOSED FRACTURE OF LEFT ANKLE WITH ROUTINE HEALING, SUBSEQUENT ENCOUNTER: Primary | ICD-10-CM

## 2025-04-17 DIAGNOSIS — Z87.81 S/P ORIF (OPEN REDUCTION INTERNAL FIXATION) FRACTURE: ICD-10-CM

## 2025-04-17 DIAGNOSIS — Z98.890 S/P ORIF (OPEN REDUCTION INTERNAL FIXATION) FRACTURE: ICD-10-CM

## 2025-04-17 DIAGNOSIS — Z48.89 AFTERCARE FOLLOWING SURGERY: ICD-10-CM

## 2025-04-17 PROCEDURE — 97110 THERAPEUTIC EXERCISES: CPT | Performed by: PHYSICAL THERAPIST

## 2025-04-17 PROCEDURE — 97112 NEUROMUSCULAR REEDUCATION: CPT | Performed by: PHYSICAL THERAPIST

## 2025-04-17 PROCEDURE — 97140 MANUAL THERAPY 1/> REGIONS: CPT | Performed by: PHYSICAL THERAPIST

## 2025-04-17 NOTE — PROGRESS NOTES
Daily Note     Today's date: 2025  Patient name: Levi Jin  : 1970  MRN: [id number]  Referring provider: Sanjay Jackson*  Dx:   Encounter Diagnosis     ICD-10-CM    1. Closed fracture of left ankle with routine healing, subsequent encounter  S82.892D       2. Aftercare following surgery  Z48.89       3. S/P ORIF (open reduction internal fixation) fracture  Z98.890     Z87.81                      Subjective: Patient reports he feels about the same overall, he feels mild improvements with toe raise.      Objective: See treatment diary below      Assessment: Tolerated treatment well. Patient continues to demonstrate slow steady progress in dorsiflexion and plantar flexion.  Patient demonstrated fatigue post treatment, exhibited good technique with therapeutic exercises, and would benefit from continued PT      Plan: Continue per plan of care.  Progress treatment as tolerated.  Progress challenge as appropriate with irirtability.     Precautions:     Precautions:   Past Medical History:   Diagnosis Date    Patient denies medical problems     Tinnitus of both ears         Insurance:  A/CMS Eval/ Re-eval Auth #/ Referral # Total units or visits Start date  Expiration date KX? Visit limitation?  PT only or  PT+OT? Co-Insurance   CMS 25   12     CMS 3/24/25   4/3/25 7/3/25  12       POC Start Date POC Expiration Date Signed POC?   25 signed   3/13/25 5/11/25 pend      Date  3/3 3/6 3/10 3/13 3/17 3/20 3/24 3/27 3/31 4/7 4   Visits/Units:  Used 1 2 3 4 5 6 7 8 9 10 11 12 13 14 15 16   Authed:  Remaining                      [facility]:  Access Code: [email or web address]  URL: [email or web address]  Date: 2025  Prepared by: Garrett Lema    Exercises  - Long Sitting Ankle Eversion with Resistance  - 2 x daily - 7 x weekly - 3 sets - 10 reps  - Long Sitting Ankle Plantar Flexion with Resistance  - 2 x daily - 7 x weekly - 3 sets - 10  "reps  - Long Sitting Ankle Inversion with Resistance  - 2 x daily - 7 x weekly - 3 sets - 10 reps  - Long Sitting Ankle Dorsiflexion with Anchored Resistance  - 2 x daily - 7 x weekly - 3 sets - 10 reps  - Long Sitting Calf Stretch with Strap  - 2 x daily - 7 x weekly - 1 sets - 10 reps - 10 hold      Manuals 4/17 4/14 4/9 4/7 4/2 3/31 3/27 3/24 3/20 3/17 3/13 3/10 3/6 3/3 2/27 2/24 2/19   L Ankle PROM BR performed all directions with posterior talar glide BR performed all directions BR performed all directions BR performed all directions BR performed all directions BR performed all directions BR performed all directions BR performed all directions BR performed all directions BR performed all directions BR performed all directions BR performed all directions   BR performed w/ effleurage  BR performed                                                                Neuro Re-Ed                    Ankle 4-way                 HEP   Heel Raises           2x10 on slantboard 2x10 on slantboard 2x 2x10 on slantboard 2x10 on slantboard 2x10 2x10 B/L    SLS       Fwd airex 2x10 Fwd airex 2x10 Star excursion 2x10 Star excursion 2x10   Star excursion 2x10 Airex 2x10 multi-directional movements Airex 10x10\" Airex 10x10\"    SL RDL [place] 4.0 2x10 ea way pallof [place] 4.0 2x10 ea way pallof  [place] 4.0 2x10 pallof  [place] 4.0 2x10 pallof [place] 4.0 2x10    [place] SL 4.0 ea direction 2x10 [place]  4.0 2x10    [place] SL 4.0 2x10 [place] 4.0 2x10     2x10  2x10     Sliders Star excursion 3x10 ea             Anterior slide 2x10      BOSU  Lunge w/ PF 2x10    Lunge w/ PF 3x10              Lat Step Downs L8 w/ lateral movement for stability challenge  L8 w/ lateral movement for stability challenge L8 3x10 L8 2x10  10x L8 step downs 10x off step      L6 2x10      Dorsiflexion for Repetitions       2x10    Seated 2x10 w/ incline board 10x 3x10 standing with rail UE support 3x10 w/ BTB  2x10        Ther Ex                    Dorsiflexion Str   " "        10x10\" sitting on chair      HEP   Assessment & Management  POC with continued emphasis on HEP with mobility and strengthening. POC with increased emphasis on plantar flexion ROM.   POC with continued emphasis on strengthening and ROM   POC with emphasis on dorsiflexor strengthening. Reassess and POC with stabilization and strengthening.  POC with continued repetitions with dorsiflexor strengthening.  POC with emphasis on continued emphasis on repeated soleus stretch POC with emphasis on frequent mobility exercise and standing exercises.    POC with emphasis on early ROM with gradual strengthening and stabilization training.   Gastroc Str                10x10\" standing    Soleus Str           Slantboard 10x10\" Slantboard 10x10\" Slantboard 10x10\"  5x10\" standing 10x10\" standing      Leg Press   Calf Press 4x10 110# Calf Press 4x10 110# Calf Press 4x10 110# Calf Press 4x10 110# Calf Press 4x10 110# Calf Press 4x10 110# Calf press 4x10 110# Calf press 4x10 100# Calf press 4x10 85# Calf Press 3x10 85#  Calf Press 3x10 85# Calf Press 3x10 80# Calf Press  2x10 70# 2x10 Calf Press 2x10 55# into stretch    Step Downs              L6 overs 2x10 L6 2x10 over cone 10x    Lunge PF  2x10 w/ manual OP w/ ER of tibia 2x10 w/ manual OP w/ ER of tibia 2x10 w/ manual OP    2x with IR/ER ea 2x10 w/ manual OP    10x w/ manual OP light 2x10 w/ PT manual OP light 2x10 w/ PT manual OP light OP 3x10 w/ PT manual OP for dorsiflexion light pressure 2x10       SL Squat w/ TRX     Biodex Lvl 10 2x10   2x10            Leg Press Calf Raise SL w/ eccentric emphasis 3x10 115#                   Ther Activity                                                            Gait Training                                                              Modalities                                                                               "

## 2025-04-21 ENCOUNTER — OFFICE VISIT (OUTPATIENT)
Dept: PHYSICAL THERAPY | Facility: CLINIC | Age: 55
End: 2025-04-21
Payer: COMMERCIAL

## 2025-04-21 DIAGNOSIS — Z98.890 S/P ORIF (OPEN REDUCTION INTERNAL FIXATION) FRACTURE: Primary | ICD-10-CM

## 2025-04-21 DIAGNOSIS — Z48.89 AFTERCARE FOLLOWING SURGERY: ICD-10-CM

## 2025-04-21 DIAGNOSIS — S82.892D CLOSED FRACTURE OF LEFT ANKLE WITH ROUTINE HEALING, SUBSEQUENT ENCOUNTER: ICD-10-CM

## 2025-04-21 DIAGNOSIS — Z87.81 S/P ORIF (OPEN REDUCTION INTERNAL FIXATION) FRACTURE: Primary | ICD-10-CM

## 2025-04-21 PROCEDURE — 97112 NEUROMUSCULAR REEDUCATION: CPT | Performed by: PHYSICAL THERAPIST

## 2025-04-21 PROCEDURE — 97140 MANUAL THERAPY 1/> REGIONS: CPT | Performed by: PHYSICAL THERAPIST

## 2025-04-21 PROCEDURE — 97110 THERAPEUTIC EXERCISES: CPT | Performed by: PHYSICAL THERAPIST

## 2025-04-21 NOTE — PROGRESS NOTES
Daily Note     Today's date: 2025  Patient name: Levi Jin  : 1970  MRN: [id number]  Referring provider: Sanjay Jackson*  Dx:   Encounter Diagnosis     ICD-10-CM    1. S/P ORIF (open reduction internal fixation) fracture  Z98.890     Z87.81       2. Aftercare following surgery  Z48.89       3. Closed fracture of left ankle with routine healing, subsequent encounter  S82.429W                      Subjective: Patient reports he feels about the same overall, continues to have difficulty with balance and lifting his toes while standing on his heels.      Objective: See treatment diary below      Assessment: Tolerated treatment fair. Patient continues to respond well to joint mobility and closed chain strengthening in session, though he continues to have limited ROM with plantar flexion and dorsiflexion at presentation.  Patient demonstrated fatigue post treatment, exhibited good technique with therapeutic exercises, and would benefit from continued PT      Plan: Continue per plan of care.  Progress treatment as tolerated.  Progress challenge as appropriate with irritability.     Precautions:     Precautions:   Past Medical History:   Diagnosis Date    Patient denies medical problems     Tinnitus of both ears         Insurance:  AMA/CMS Eval/ Re-eval Auth #/ Referral # Total units or visits Start date  Expiration date KX? Visit limitation?  PT only or  PT+OT? Co-Insurance   CMS 25   12     CMS 3/24/25   4/3/25 7/3/25  12       POC Start Date POC Expiration Date Signed POC?   25 signed   3/13/25 5/11/25 pend      Date  3/3 3/6 3/10 3/13 3/17 3/20 3/24 3/27 3/31 4/7 4/9 4/14 4/17   Visits/Units:  Used 1 2 3 4 5 6 7 8 9 10 11 12 13 14 15 16   Authed:  Remaining                      [employer]:  Access Code: [email or web address]  URL: [email or web address]  Date: 2025  Prepared by: Garrett Lema    Exercises  - Long Sitting Ankle Eversion with  "Resistance  - 2 x daily - 7 x weekly - 3 sets - 10 reps  - Long Sitting Ankle Plantar Flexion with Resistance  - 2 x daily - 7 x weekly - 3 sets - 10 reps  - Long Sitting Ankle Inversion with Resistance  - 2 x daily - 7 x weekly - 3 sets - 10 reps  - Long Sitting Ankle Dorsiflexion with Anchored Resistance  - 2 x daily - 7 x weekly - 3 sets - 10 reps  - Long Sitting Calf Stretch with Strap  - 2 x daily - 7 x weekly - 1 sets - 10 reps - 10 hold      Manuals 4/21 4/17 4/14 4/9 4/7 4/2 3/31 3/27 3/24 3/20 3/17 3/13 3/10 3/6 3/3 2/27 2/24 2/19   L Ankle PROM BR performed all directions with posterior talar glide BR performed all directions with posterior talar glide BR performed all directions BR performed all directions BR performed all directions BR performed all directions BR performed all directions BR performed all directions BR performed all directions BR performed all directions BR performed all directions BR performed all directions BR performed all directions   BR performed w/ effleurage  BR performed                                                                   Neuro Re-Ed                     Ankle 4-way                  HEP   Heel Raises            2x10 on slantboard 2x10 on slantboard 2x 2x10 on slantboard 2x10 on slantboard 2x10 2x10 B/L    SLS        Fwd airex 2x10 Fwd airex 2x10 Star excursion 2x10 Star excursion 2x10   Star excursion 2x10 Airex 2x10 multi-directional movements Airex 10x10\" Airex 10x10\"    SL RDL [place] 4.0 2x10 ea way pallof [place] 4.0 2x10 ea way pallof [place] 4.0 2x10 ea way pallof  [place] 4.0 2x10 pallof  [place] 4.0 2x10 pallof [place] 4.0 2x10    [place] SL 4.0 ea direction 2x10 [place]  4.0 2x10    [place] SL 4.0 2x10 [place] 4.0 2x10     2x10  2x10     Sliders  Star excursion 3x10 ea             Anterior slide 2x10      BOSU Lunge w/ PF 2x10  Lunge w/ PF 2x10    Lunge w/ PF 3x10              Lat Step Downs L8 w/ star excursion to depth 3x10  L8 w/ lateral movement for stability " "challenge  L8 w/ lateral movement for stability challenge L8 3x10 L8 2x10  10x L8 step downs 10x off step      L6 2x10      Dorsiflexion for Repetitions        2x10    Seated 2x10 w/ incline board 10x 3x10 standing with rail UE support 3x10 w/ BTB  2x10        Ther Ex                     Dorsiflexion Str            10x10\" sitting on chair      HEP   Assessment & Management   POC with continued emphasis on HEP with mobility and strengthening. POC with increased emphasis on plantar flexion ROM.   POC with continued emphasis on strengthening and ROM   POC with emphasis on dorsiflexor strengthening. Reassess and POC with stabilization and strengthening.  POC with continued repetitions with dorsiflexor strengthening.  POC with emphasis on continued emphasis on repeated soleus stretch POC with emphasis on frequent mobility exercise and standing exercises.    POC with emphasis on early ROM with gradual strengthening and stabilization training.   Gastroc Str                 10x10\" standing    Soleus Str            Slantboard 10x10\" Slantboard 10x10\" Slantboard 10x10\"  5x10\" standing 10x10\" standing      Leg Press  Calf Press 4x10 110#  Calf Press 4x10 110# Calf Press 4x10 110# Calf Press 4x10 110# Calf Press 4x10 110# Calf Press 4x10 110# Calf Press 4x10 110# Calf press 4x10 110# Calf press 4x10 100# Calf press 4x10 85# Calf Press 3x10 85#  Calf Press 3x10 85# Calf Press 3x10 80# Calf Press  2x10 70# 2x10 Calf Press 2x10 55# into stretch    Step Downs               L6 overs 2x10 L6 2x10 over cone 10x    Lunge PF   2x10 w/ manual OP w/ ER of tibia 2x10 w/ manual OP w/ ER of tibia 2x10 w/ manual OP    2x with IR/ER ea 2x10 w/ manual OP    10x w/ manual OP light 2x10 w/ PT manual OP light 2x10 w/ PT manual OP light OP 3x10 w/ PT manual OP for dorsiflexion light pressure 2x10       SL Squat w/ TRX      Biodex Lvl 10 2x10   2x10            Leg Press  Calf Raise SL w/ eccentric emphasis 3x10 115#                   Ther Activity   "                                                             Gait Training                                                                 Modalities

## 2025-04-24 ENCOUNTER — OFFICE VISIT (OUTPATIENT)
Dept: PHYSICAL THERAPY | Facility: CLINIC | Age: 55
End: 2025-04-24
Payer: COMMERCIAL

## 2025-04-24 DIAGNOSIS — S82.892D CLOSED FRACTURE OF LEFT ANKLE WITH ROUTINE HEALING, SUBSEQUENT ENCOUNTER: ICD-10-CM

## 2025-04-24 DIAGNOSIS — Z48.89 AFTERCARE FOLLOWING SURGERY: ICD-10-CM

## 2025-04-24 DIAGNOSIS — Z98.890 S/P ORIF (OPEN REDUCTION INTERNAL FIXATION) FRACTURE: Primary | ICD-10-CM

## 2025-04-24 DIAGNOSIS — Z87.81 S/P ORIF (OPEN REDUCTION INTERNAL FIXATION) FRACTURE: Primary | ICD-10-CM

## 2025-04-24 PROCEDURE — 97112 NEUROMUSCULAR REEDUCATION: CPT | Performed by: PHYSICAL THERAPIST

## 2025-04-24 PROCEDURE — 97140 MANUAL THERAPY 1/> REGIONS: CPT | Performed by: PHYSICAL THERAPIST

## 2025-04-24 PROCEDURE — 97110 THERAPEUTIC EXERCISES: CPT | Performed by: PHYSICAL THERAPIST

## 2025-04-24 NOTE — PROGRESS NOTES
Daily Note     Today's date: 2025  Patient name: Levi Jin  : 1970  MRN: [id number]  Referring provider: Sanjay Jackson*  Dx:   Encounter Diagnosis     ICD-10-CM    1. S/P ORIF (open reduction internal fixation) fracture  Z98.890     Z87.81       2. Aftercare following surgery  Z48.89       3. Closed fracture of left ankle with routine healing, subsequent encounter  S82.755J                      Subjective: Patient reports he feels very slow progress to this point.      Objective: See treatment diary below      Assessment: Tolerated treatment well. Patient continues to have minimal progress with dorsiflexor strength, though ROM with dorsiflexion with steps ambulation is steadily improving.  Patient demonstrated fatigue post treatment, exhibited good technique with therapeutic exercises, and would benefit from continued PT      Plan: Continue per plan of care.  Progress treatment as tolerated.  Progress challenge as appropriate with irritability.     Precautions:     Precautions:   Past Medical History:   Diagnosis Date    Patient denies medical problems     Tinnitus of both ears         Insurance:  AMA/CMS Eval/ Re-eval Auth #/ Referral # Total units or visits Start date  Expiration date KX? Visit limitation?  PT only or  PT+OT? Co-Insurance   CMS 25   12     CMS 3/24/25   4/3/25 7/3/25  12       POC Start Date POC Expiration Date Signed POC?   25 signed   3/13/25 5/11/25 pend      Date  3/3 3/6 3/10 3/13 3/17 3/20 3/24 3/27 3/31 4 4   Visits/Units:  Used 1 2 3 4 5 6 7 8 9 10 11 12 13 14 15 16 17   Authed:  Remaining                       [employer]:  Access Code: [email or web address]  URL: [email or web address]  Date: 2025  Prepared by: Garrett Lema    Exercises  - Long Sitting Ankle Eversion with Resistance  - 2 x daily - 7 x weekly - 3 sets - 10 reps  - Long Sitting Ankle Plantar Flexion with Resistance  - 2 x daily - 7  "x weekly - 3 sets - 10 reps  - Long Sitting Ankle Inversion with Resistance  - 2 x daily - 7 x weekly - 3 sets - 10 reps  - Long Sitting Ankle Dorsiflexion with Anchored Resistance  - 2 x daily - 7 x weekly - 3 sets - 10 reps  - Long Sitting Calf Stretch with Strap  - 2 x daily - 7 x weekly - 1 sets - 10 reps - 10 hold      Manuals 4/23 4/21 4/17 4/14 4/9 4/7 4/2 3/31 3/27 3/24 3/20 3/17 3/13 3/10 3/6 3/3 2/27 2/24 2/19   L Ankle PROM BR performed all directions with posterior talar glide BR performed all directions with posterior talar glide BR performed all directions with posterior talar glide BR performed all directions BR performed all directions BR performed all directions BR performed all directions BR performed all directions BR performed all directions BR performed all directions BR performed all directions BR performed all directions BR performed all directions BR performed all directions   BR performed w/ effleurage  BR performed                                                                      Neuro Re-Ed                      Ankle 4-way                   HEP   Heel Raises             2x10 on slantboard 2x10 on slantboard 2x 2x10 on slantboard 2x10 on slantboard 2x10 2x10 B/L    SLS 2x10 hip ER and IR        Fwd airex 2x10 Fwd airex 2x10 Star excursion 2x10 Star excursion 2x10   Star excursion 2x10 Airex 2x10 multi-directional movements Airex 10x10\" Airex 10x10\"    SL RDL  [place] 4.0 2x10 ea way pallof [place] 4.0 2x10 ea way pallof [place] 4.0 2x10 ea way pallof  [place] 4.0 2x10 pallof  [place] 4.0 2x10 pallof [place] 4.0 2x10    [place] SL 4.0 ea direction 2x10 [place]  4.0 2x10    [place] SL 4.0 2x10 [place] 4.0 2x10     2x10  2x10     Sliders   Star excursion 3x10 ea             Anterior slide 2x10      BOSU Lunge w/ PF 2x10 Lunge w/ PF 2x10  Lunge w/ PF 2x10    Lunge w/ PF 3x10              Lat Step Downs L8 w/ star excursion to depth 3x10 L8 w/ star excursion to depth 3x10  L8 w/ lateral movement for " "stability challenge  L8 w/ lateral movement for stability challenge L8 3x10 L8 2x10  10x L8 step downs 10x off step      L6 2x10      Dorsiflexion for Repetitions         2x10    Seated 2x10 w/ incline board 10x 3x10 standing with rail UE support 3x10 w/ BTB  2x10        Ther Ex                      Dorsiflexion Str             10x10\" sitting on chair      HEP   Assessment & Management    POC with continued emphasis on HEP with mobility and strengthening. POC with increased emphasis on plantar flexion ROM.   POC with continued emphasis on strengthening and ROM   POC with emphasis on dorsiflexor strengthening. Reassess and POC with stabilization and strengthening.  POC with continued repetitions with dorsiflexor strengthening.  POC with emphasis on continued emphasis on repeated soleus stretch POC with emphasis on frequent mobility exercise and standing exercises.    POC with emphasis on early ROM with gradual strengthening and stabilization training.   Gastroc Str                  10x10\" standing    Soleus Str             Slantboard 10x10\" Slantboard 10x10\" Slantboard 10x10\"  5x10\" standing 10x10\" standing      Leg Press  Calf Press 4x10 115# Calf Press 4x10 110#  Calf Press 4x10 110# Calf Press 4x10 110# Calf Press 4x10 110# Calf Press 4x10 110# Calf Press 4x10 110# Calf Press 4x10 110# Calf press 4x10 110# Calf press 4x10 100# Calf press 4x10 85# Calf Press 3x10 85#  Calf Press 3x10 85# Calf Press 3x10 80# Calf Press  2x10 70# 2x10 Calf Press 2x10 55# into stretch    Step Downs                L6 overs 2x10 L6 2x10 over cone 10x    Lunge PF    2x10 w/ manual OP w/ ER of tibia 2x10 w/ manual OP w/ ER of tibia 2x10 w/ manual OP    2x with IR/ER ea 2x10 w/ manual OP    10x w/ manual OP light 2x10 w/ PT manual OP light 2x10 w/ PT manual OP light OP 3x10 w/ PT manual OP for dorsiflexion light pressure 2x10       SL Squat w/ TRX       Biodex Lvl 10 2x10   2x10            Leg Press   Calf Raise SL w/ eccentric emphasis " 3x10 115#                   Ther Activity                                                                  Gait Training                                                                    Modalities

## 2025-04-28 ENCOUNTER — OFFICE VISIT (OUTPATIENT)
Dept: PHYSICAL THERAPY | Facility: CLINIC | Age: 55
End: 2025-04-28
Payer: COMMERCIAL

## 2025-04-28 DIAGNOSIS — Z98.890 S/P ORIF (OPEN REDUCTION INTERNAL FIXATION) FRACTURE: Primary | ICD-10-CM

## 2025-04-28 DIAGNOSIS — S82.892D CLOSED FRACTURE OF LEFT ANKLE WITH ROUTINE HEALING, SUBSEQUENT ENCOUNTER: ICD-10-CM

## 2025-04-28 DIAGNOSIS — Z48.89 AFTERCARE FOLLOWING SURGERY: ICD-10-CM

## 2025-04-28 DIAGNOSIS — Z87.81 S/P ORIF (OPEN REDUCTION INTERNAL FIXATION) FRACTURE: Primary | ICD-10-CM

## 2025-04-28 PROCEDURE — 97112 NEUROMUSCULAR REEDUCATION: CPT | Performed by: PHYSICAL THERAPIST

## 2025-04-28 PROCEDURE — 97140 MANUAL THERAPY 1/> REGIONS: CPT | Performed by: PHYSICAL THERAPIST

## 2025-04-28 PROCEDURE — 97110 THERAPEUTIC EXERCISES: CPT | Performed by: PHYSICAL THERAPIST

## 2025-04-28 NOTE — PROGRESS NOTES
Daily Note     Today's date: 2025  Patient name: Levi Jin  : 1970  MRN: [id number]  Referring provider: Sanjay Jackson*  Dx:   Encounter Diagnosis     ICD-10-CM    1. S/P ORIF (open reduction internal fixation) fracture  Z98.890     Z87.81       2. Aftercare following surgery  Z48.89       3. Closed fracture of left ankle with routine healing, subsequent encounter  S82.239S                      Subjective: Patient reports he feels about the same overall, he continues to have difficulty with ankle stiffness.      Objective: See treatment diary below      Assessment: Tolerated treatment well. Patient continues to demonstrate gradual progress with balance and plantar flexion/dorsiflexor strength.  Patient demonstrated fatigue post treatment, exhibited good technique with therapeutic exercises, and would benefit from continued PT      Plan: Continue per plan of care.  Progress treatment as tolerated.  Progress challenge as appropriate with irritability.     Precautions:     Precautions:   Past Medical History:   Diagnosis Date    Patient denies medical problems     Tinnitus of both ears         Insurance:  A/CMS Eval/ Re-eval Auth #/ Referral # Total units or visits Start date  Expiration date KX? Visit limitation?  PT only or  PT+OT? Co-Insurance   CMS 25   12     CMS 3/24/25   4/3/25 7/3/25  12       POC Start Date POC Expiration Date Signed POC?   25 signed   3/13/25 5/11/25 pend      Date  3/3 3/6 3/10 3/13 3/17 3/20 3/24 3/27 3/31 4 4   Visits/Units:  Used 1 2 3 4 5 6 7 8 9 10 11 12 13 14 15 16 17 18   Authed:  Remaining                        [employer]:  Access Code: [email or web address]  URL: [email or web address]  Date: 2025  Prepared by: Garrett Lema    Exercises  - Long Sitting Ankle Eversion with Resistance  - 2 x daily - 7 x weekly - 3 sets - 10 reps  - Long Sitting Ankle Plantar Flexion with Resistance  -  "2 x daily - 7 x weekly - 3 sets - 10 reps  - Long Sitting Ankle Inversion with Resistance  - 2 x daily - 7 x weekly - 3 sets - 10 reps  - Long Sitting Ankle Dorsiflexion with Anchored Resistance  - 2 x daily - 7 x weekly - 3 sets - 10 reps  - Long Sitting Calf Stretch with Strap  - 2 x daily - 7 x weekly - 1 sets - 10 reps - 10 hold      Manuals 4/28 4/23 4/21 4/17 4/14 4/9 4/7 4/2 3/31 3/27 3/24 3/20 3/17 3/13 3/10 3/6 3/3 2/27 2/24 2/19   L Ankle PROM BR performed all directions with posterior talar glide BR performed all directions with posterior talar glide BR performed all directions with posterior talar glide BR performed all directions with posterior talar glide BR performed all directions BR performed all directions BR performed all directions BR performed all directions BR performed all directions BR performed all directions BR performed all directions BR performed all directions BR performed all directions BR performed all directions BR performed all directions   BR performed w/ effleurage  BR performed                                                                         Neuro Re-Ed                       Ankle 4-way                    HEP   Heel Raises              2x10 on slantboard 2x10 on slantboard 2x 2x10 on slantboard 2x10 on slantboard 2x10 2x10 B/L    SLS 2x10 hip ER and IR 2x10 hip ER and IR        Fwd airex 2x10 Fwd airex 2x10 Star excursion 2x10 Star excursion 2x10   Star excursion 2x10 Airex 2x10 multi-directional movements Airex 10x10\" Airex 10x10\"    SL RDL   [place] 4.0 2x10 ea way pallof [place] 4.0 2x10 ea way pallof [place] 4.0 2x10 ea way pallof  [place] 4.0 2x10 pallof  [place] 4.0 2x10 pallof [place] 4.0 2x10    [place] SL 4.0 ea direction 2x10 [place]  4.0 2x10    [place] SL 4.0 2x10 [place] 4.0 2x10     2x10  2x10     Sliders    Star excursion 3x10 ea             Anterior slide 2x10      BOSU Lunge w/ PF 2x10 Lunge w/ PF 2x10 Lunge w/ PF 2x10  Lunge w/ PF 2x10    Lunge w/ PF 3x10            " "  Lat Step Downs L8 w/ star excursion to depth 3x10 L8 w/ star excursion to depth 3x10 L8 w/ star excursion to depth 3x10  L8 w/ lateral movement for stability challenge  L8 w/ lateral movement for stability challenge L8 3x10 L8 2x10  10x L8 step downs 10x off step      L6 2x10      Dorsiflexion for Repetitions          2x10    Seated 2x10 w/ incline board 10x 3x10 standing with rail UE support 3x10 w/ BTB  2x10        Ther Ex                       Dorsiflexion Str              10x10\" sitting on chair      HEP   Assessment & Management     POC with continued emphasis on HEP with mobility and strengthening. POC with increased emphasis on plantar flexion ROM.   POC with continued emphasis on strengthening and ROM   POC with emphasis on dorsiflexor strengthening. Reassess and POC with stabilization and strengthening.  POC with continued repetitions with dorsiflexor strengthening.  POC with emphasis on continued emphasis on repeated soleus stretch POC with emphasis on frequent mobility exercise and standing exercises.    POC with emphasis on early ROM with gradual strengthening and stabilization training.   Gastroc Str                   10x10\" standing    Soleus Str              Slantboard 10x10\" Slantboard 10x10\" Slantboard 10x10\"  5x10\" standing 10x10\" standing      Leg Press  Calf press 4x10 115# Calf Press 4x10 115# Calf Press 4x10 110#  Calf Press 4x10 110# Calf Press 4x10 110# Calf Press 4x10 110# Calf Press 4x10 110# Calf Press 4x10 110# Calf Press 4x10 110# Calf press 4x10 110# Calf press 4x10 100# Calf press 4x10 85# Calf Press 3x10 85#  Calf Press 3x10 85# Calf Press 3x10 80# Calf Press  2x10 70# 2x10 Calf Press 2x10 55# into stretch    Step Downs                 L6 overs 2x10 L6 2x10 over cone 10x    Lunge PF 2x10 w/ manual OP w/ ER of tibia    2x10 w/ manual OP w/ ER of tibia 2x10 w/ manual OP w/ ER of tibia 2x10 w/ manual OP    2x with IR/ER ea 2x10 w/ manual OP    10x w/ manual OP light 2x10 w/ PT manual " OP light 2x10 w/ PT manual OP light OP 3x10 w/ PT manual OP for dorsiflexion light pressure 2x10       SL Squat w/ TRX        Biodex Lvl 10 2x10   2x10            Leg Press    Calf Raise SL w/ eccentric emphasis 3x10 115#                   Ther Activity                                                                     Gait Training                                                                       Modalities

## 2025-05-01 ENCOUNTER — APPOINTMENT (OUTPATIENT)
Dept: PHYSICAL THERAPY | Facility: CLINIC | Age: 55
End: 2025-05-01
Payer: COMMERCIAL

## 2025-05-05 ENCOUNTER — APPOINTMENT (OUTPATIENT)
Dept: PHYSICAL THERAPY | Facility: CLINIC | Age: 55
End: 2025-05-05
Payer: COMMERCIAL

## 2025-05-07 ENCOUNTER — OFFICE VISIT (OUTPATIENT)
Dept: PHYSICAL THERAPY | Facility: CLINIC | Age: 55
End: 2025-05-07
Attending: ORTHOPAEDIC SURGERY
Payer: COMMERCIAL

## 2025-05-07 DIAGNOSIS — S82.892D CLOSED FRACTURE OF LEFT ANKLE WITH ROUTINE HEALING, SUBSEQUENT ENCOUNTER: ICD-10-CM

## 2025-05-07 DIAGNOSIS — Z87.81 S/P ORIF (OPEN REDUCTION INTERNAL FIXATION) FRACTURE: Primary | ICD-10-CM

## 2025-05-07 DIAGNOSIS — Z98.890 S/P ORIF (OPEN REDUCTION INTERNAL FIXATION) FRACTURE: Primary | ICD-10-CM

## 2025-05-07 DIAGNOSIS — Z48.89 AFTERCARE FOLLOWING SURGERY: ICD-10-CM

## 2025-05-07 PROCEDURE — 97112 NEUROMUSCULAR REEDUCATION: CPT | Performed by: PHYSICAL THERAPIST

## 2025-05-07 PROCEDURE — 97140 MANUAL THERAPY 1/> REGIONS: CPT | Performed by: PHYSICAL THERAPIST

## 2025-05-07 PROCEDURE — 97110 THERAPEUTIC EXERCISES: CPT | Performed by: PHYSICAL THERAPIST

## 2025-05-07 NOTE — PROGRESS NOTES
PT Re-Evaluation     Today's date: 2025  Patient name: Levi Jin  : 1970  MRN: [id number]  Referring provider: Sanjay Jackson*  Dx:   Encounter Diagnosis     ICD-10-CM    1. S/P ORIF (open reduction internal fixation) fracture  Z98.890     Z87.81       2. Aftercare following surgery  Z48.89       3. Closed fracture of left ankle with routine healing, subsequent encounter  S82.892D                      Assessment  Impairments: abnormal gait, abnormal or restricted ROM, abnormal movement, activity intolerance, impaired balance, impaired physical strength, lacks appropriate home exercise program, pain with function, weight-bearing intolerance, poor body mechanics and unable to perform ADL     Assessment details: Levi Jin is a 54 y.o. male  who presents status post L ankle ORIF of 16 weeks with the associated impairments including: pain, decreased strength, decreased ROM, decreased joint mobility, joint effusion, and ambulatory dysfunction.  He continues to demonstrate ROM deficits with greatest limitation into plantar flexion and eversion.  He continues to have weakness with MMT into plantar flexion, dorsiflexion, and eversion.  He continues to have difficulty with foot slap motion when walking, particularly with increased stride length.  Single leg stabilization continues to be a significant impairment as evidenced by inability to maintain balance on BOSU and airex with motion.  This dynamic stability deficit is particularly troubling as he frequently works on unstable surfaces for work.  He continues to have the following functional limitations including: restricted ADL's, prolonged standing, prolonged sitting, transfers, squatting, functional mobility, recreational activities, work-related activities, lifting/carrying, inability to golf, inability to work, and inability to skii. Patient's signs and symptoms are consistent with that of the referring diagnosis.       Patient would likely  benefit from continued skilled physical therapy services to address their aforementioned functional limitations through a targeted program consisting of repeated ROM/flexibility exercises, graded strengthening to global ankle/LE muscles, static/dynamic balance exercises, and graded increase in functional activity training in order to progress towards prior level of function and independence with home exercise program.          Understanding of Dx/Px/POC: good     Prognosis: good     Goals  STG: to be achieved within 2-4 weeks  1. Pt will be able to ambulate community distances with normal heel to toe pattern.-met  2. Improve strength so that pt will be be able to perform sit to stand transfers without UE support.-met  3. Patient will be able to ascend steps without pain or limitation.-met  4. Pt will be I with HEP-progressing towards     LTG:  to be achieved within 4-8 weeks  1. Pt will be able to negotiate stairs reciprocally without hand rail assistance.-progressing towards  2. Improve SLS balance with EO of greater than 30s for facilitation of return to skiing.-progressing towards  3. Pt will be able to ambulate without time restrictions pain free.-progressing towards  4. Pt will return to squatting and lifting 40 pounds without pain or restriction.-progressing towards  5. Pt will return to skiing without limitation (6-8 months)-progressing towards        Plan  Patient would benefit from: PT eval and skilled physical therapy  Planned modality interventions: cryotherapy, TENS, thermotherapy: hydrocollator packs, electrical stimulation/Israeli stimulation and manual electrical stimulation     Planned therapy interventions: manual therapy, neuromuscular re-education, self care, therapeutic activities, therapeutic exercise, home exercise program, joint mobilization, balance, gait training, flexibility, strengthening, stretching, patient education, ADL training, activity modification, body mechanics training, group  therapy, graded exercise, graded activity, functional ROM exercises and therapeutic training     Frequency: 2x week  Duration in weeks: 8  Treatment plan discussed with: patient  Plan details: HEP development, stretching, strengthening, A/AA/PROM, joint mobilizations, posture education, STM/MI as needed to reduce muscle tension, muscle reeducation, PLOC discussed and agreed upon with patient.     Subjective Evaluation     History of Present Illness  Date of surgery: 1/3/2025  Mechanism of injury: surgery  Mechanism of injury: Levi Jin is a 54 y.o. male who presents status post L ankle ORIF of 16.5 weeks after a skiing accident.  Patient reports he continues to have balance deficits when walking on unstable surfaces.  He moved his son this weekend which involved frequent stairs and walking at which point he was limping heavily at the end.  He also was unable to carry objects up and down the stairs due to instability and uncertainty that he would be able to maintain position.  He is concerned he will have trouble working as he often walks on unstable surfaces.        Not a recurrent problem   Quality of life: good     Patient Goals  Patient goals for therapy: increased strength, decreased pain, independence with ADLs/IADLs, return to sport/leisure activities, increased motion, improved balance and return to work  Patient goal: Return to skiing, golf, and all work activities without limitation.  Pain  Current pain ratin  At best pain ratin  At worst pain rating: 3  Quality: discomfort, tight, radiating and dull ache  Relieving factors: rest  Aggravating factors: walking and stair climbing     Social Support  Steps to enter house: yes  Stairs in house: yes   Lives in: multiple-level home  Lives with: spouse     Employment status: working  Hand dominance: right  Exercise history: Skiing and heavy occupational work        Objective      Active Range of Motion   Left Ankle/Foot   Dorsiflexion (ke): 18 degrees    Plantar flexion: 51 degrees with pain  Inversion: 31 degrees   Eversion: 10 degrees      Right Ankle/Foot   Dorsiflexion (ke): 20 degrees   Plantar flexion: 65 degrees   Inversion: 45 degrees   Eversion: 10 degrees      Strength/Myotome Testing      Left Ankle/Foot   Dorsiflexion: 4-  Plantar flexion: 4  Inversion: 4  Eversion: 4     Right Ankle/Foot   Dorsiflexion: 4+  Plantar flexion: 4+  Inversion: 4+  Eversion: 4+     Additional Strength Details  Ambulation: Increased speed of heel off on the L LE compared to R, foot slap with increased stride length.     Squat: Mild trunk lean to L     Step Ups/Downs: Mild valgus and excessive hip hinge.     SLS: L: 19s  R: 30s.       Precautions:   Past Medical History:   Diagnosis Date    Patient denies medical problems     Tinnitus of both ears         Insurance:  A/CMS Eval/ Re-eval Auth #/ Referral # Total units or visits Start date  Expiration date KX? Visit limitation?  PT only or  PT+OT? Co-Insurance   CMS 2/19/25 2/19/25   12     CMS 3/24/25   4/3/25 7/3/25  12       POC Start Date POC Expiration Date Signed POC?   2/19/25 4/18/25 signed   3/13/25 5/11/25 pend      Date 2/19 2/24 2/27 3/3 3/6 3/10 3/13 3/17 3/20 3/24 3/27 3/31 4/7 4/9 4/14 4/17 4/23 4/28 5/7   Visits/Units:  Used 1 2 3 4 5 6 7 8 9 10 11 12 13 14 15 16 17 18 19   Authed:  [facility]                         [employer]:  Access Code: [email or web address]  URL: [email or web address]  Date: 02/19/2025  Prepared by: Garrett Lema    Exercises  - Long Sitting Ankle Eversion with Resistance  - 2 x daily - 7 x weekly - 3 sets - 10 reps  - Long Sitting Ankle Plantar Flexion with Resistance  - 2 x daily - 7 x weekly - 3 sets - 10 reps  - Long Sitting Ankle Inversion with Resistance  - 2 x daily - 7 x weekly - 3 sets - 10 reps  - Long Sitting Ankle Dorsiflexion with Anchored Resistance  - 2 x daily - 7 x weekly - 3 sets - 10 reps  - Long Sitting Calf Stretch with Strap  - 2 x daily - 7 x weekly - 1 sets - 10  "reps - 10 hold      L Ankle PROM 5/7 BR performed all directions with posterior talar glide BR performed all directions with posterior talar glide                      Neuro Re-Ed      Ankle 4-way   HEP   Heel Raises      SLS 2x10 hip ER and IR 2x10 hip ER and IR    SL RDL      Sliders      BOSU Lunge w/ PF 2x10    Step ups 10x10\" Lunge w/ PF 2x10    Lat Step Downs L8 w/ star excursion 2x10 L8 w/ star excursion to depth 3x10    Dorsiflexion for Repetitions      Ther Ex      Dorsiflexion Str   HEP   Assessment & Management Reassessment of symptoms  POC with emphasis on early ROM with gradual strengthening and stabilization training.   Gastroc Str      Soleus Str      Leg Press  Calf press 4x10 120# Calf press 4x10 115#    Step Downs      Lunge PF  2x10 w/ manual OP w/ ER of tibia    SL Squat w/ TRX      Leg Press      Ther Activity                  Gait Training                  Modalities                                        "

## 2025-05-08 ENCOUNTER — APPOINTMENT (OUTPATIENT)
Dept: PHYSICAL THERAPY | Facility: CLINIC | Age: 55
End: 2025-05-08
Attending: ORTHOPAEDIC SURGERY
Payer: COMMERCIAL

## 2025-05-08 ENCOUNTER — OFFICE VISIT (OUTPATIENT)
Dept: OBGYN CLINIC | Facility: CLINIC | Age: 55
End: 2025-05-08
Payer: COMMERCIAL

## 2025-05-08 VITALS — HEIGHT: 73 IN | BODY MASS INDEX: 28.23 KG/M2 | WEIGHT: 213 LBS

## 2025-05-08 DIAGNOSIS — S82.62XD DISPLACED FRACTURE OF LATERAL MALLEOLUS OF LEFT FIBULA, SUBSEQUENT ENCOUNTER FOR CLOSED FRACTURE WITH ROUTINE HEALING: Primary | ICD-10-CM

## 2025-05-08 PROBLEM — Z87.81 S/P ORIF (OPEN REDUCTION INTERNAL FIXATION) FRACTURE: Status: ACTIVE | Noted: 2025-05-08

## 2025-05-08 PROBLEM — Z98.890 S/P ORIF (OPEN REDUCTION INTERNAL FIXATION) FRACTURE: Status: ACTIVE | Noted: 2025-05-08

## 2025-05-08 PROCEDURE — 99213 OFFICE O/P EST LOW 20 MIN: CPT | Performed by: ORTHOPAEDIC SURGERY

## 2025-05-08 NOTE — PROGRESS NOTES
Patient Name: Levi Jin      : 1970       MRN: [id number]   Encounter Provider: Sanjay Jackson MD   Encounter Date: 25  Encounter department: [facility]         Assessment & Plan  Displaced fracture of lateral malleolus of left fibula, subsequent encounter for closed fracture with routine healing  He is 4 months s/p Left lateral maleolus open reduction internal fixation - Left on 1/3/2025. He is doing well postoperatively. We discussed that he may continue to see improvements over the next 2 months and even up to one year after surgery. He may return to work with a 50 pound weight restriction, as well as no uneven ground climbing or ladder use. A note was provided for this. He will follow-up in 6 weeks. We will plan to lift all work restrictions at that time.          _____________________________________________________  CHIEF COMPLAINT:  Chief Complaint   Patient presents with    Left Ankle - Post-op     Limited mobility.         SUBJECTIVE:  Patient is a 54 y.o. year old male who presents for follow up now 4 months s/p Left lateral maleolus open reduction internal fixation - Left performed on 1/3/2025.  Today patient has no significant pain about the left ankle. He feels his range of motion remains limited. He pushes himself to work on mobility and endurance. He finds the left ankle fatigues easier than the right. He denies taking any over the counter pain medications at this time.    _____________________________________________________  PHYSICAL EXAM:  General/Constitutional: NAD, well developed, well nourished  HENT: Normocephalic, atraumatic  CV: Intact distal pulses, regular rate  Resp: No respiratory distress or labored breathing  Abdomen: soft, nondistended   Lymphatic: No lymphadenopathy palpated  Neuro: Alert and Oriented x 3, no focal deficits  Psych: Normal mood, normal affect  Skin: Warm, dry, no rashes, no erythema    MUSCULOSKELETAL  EXAMINATION:    Ankle Examination (focused):     Incision appears well-healed  Swelling: none  ROM:    Dorsiflexion: 15   Plantarflexion: 30   Inversion/eversion: well-maintained  Strength:    Dorsiflexion:5/5   Plantarflexion: 5/5   Peroneals: 5/5   Tibialis Posterior: 5/5    Gait: normal    No subluxation of the peroneal tendons or tenderness to palpation along the peroneal tendons     No pain with palpation or range of motion of midfoot and forefoot bilaterally    No calf tenderness to palpation bilaterally    LE NV Exam: +2 DP/PT pulses bilaterally  Sensation intact to light touch L2-S1 bilaterally     Scribe Attestation      I,:  Salma León am acting as a scribe while in the presence of the attending physician.:       I,:  Sanjay Jackson MD personally performed the services described in this documentation    as scribed in my presence.:

## 2025-05-08 NOTE — ASSESSMENT & PLAN NOTE
He is 4 months s/p Left lateral maleolus open reduction internal fixation - Left on 1/3/2025. He is doing well postoperatively. We discussed that he may continue to see improvements over the next 2 months and even up to one year after surgery. He may return to work with a 50 pound weight restriction, as well as no uneven ground climbing or ladder use. A note was provided for this. He will follow-up in 6 weeks. We will plan to lift all work restrictions at that time.

## 2025-05-12 ENCOUNTER — APPOINTMENT (OUTPATIENT)
Dept: PHYSICAL THERAPY | Facility: CLINIC | Age: 55
End: 2025-05-12
Attending: ORTHOPAEDIC SURGERY
Payer: COMMERCIAL

## 2025-05-15 ENCOUNTER — OFFICE VISIT (OUTPATIENT)
Dept: PHYSICAL THERAPY | Facility: CLINIC | Age: 55
End: 2025-05-15
Attending: ORTHOPAEDIC SURGERY
Payer: COMMERCIAL

## 2025-05-15 DIAGNOSIS — Z98.890 S/P ORIF (OPEN REDUCTION INTERNAL FIXATION) FRACTURE: Primary | ICD-10-CM

## 2025-05-15 DIAGNOSIS — Z48.89 AFTERCARE FOLLOWING SURGERY: ICD-10-CM

## 2025-05-15 DIAGNOSIS — S82.892D CLOSED FRACTURE OF LEFT ANKLE WITH ROUTINE HEALING, SUBSEQUENT ENCOUNTER: ICD-10-CM

## 2025-05-15 DIAGNOSIS — Z87.81 S/P ORIF (OPEN REDUCTION INTERNAL FIXATION) FRACTURE: Primary | ICD-10-CM

## 2025-05-15 PROCEDURE — 97110 THERAPEUTIC EXERCISES: CPT | Performed by: PHYSICAL THERAPIST

## 2025-05-15 PROCEDURE — 97112 NEUROMUSCULAR REEDUCATION: CPT | Performed by: PHYSICAL THERAPIST

## 2025-05-15 PROCEDURE — 97140 MANUAL THERAPY 1/> REGIONS: CPT | Performed by: PHYSICAL THERAPIST

## 2025-05-15 NOTE — PROGRESS NOTES
Daily Note     Today's date: 5/15/2025  Patient name: Levi Jin  : 1970  MRN: [id number]  Referring provider: Sanjay Jackson*  Dx:   Encounter Diagnosis     ICD-10-CM    1. S/P ORIF (open reduction internal fixation) fracture  Z98.890     Z87.81       2. Aftercare following surgery  Z48.89       3. Closed fracture of left ankle with routine healing, subsequent encounter  S82.050P                      Subjective: Patient reports he feels his balance is steadily improving.      Objective: See treatment diary below      Assessment: Tolerated treatment well. Patient continues to progress with stabilization, able to tolerate step ups on BOSU and maintain position without LOB.  Patient demonstrated fatigue post treatment, exhibited good technique with therapeutic exercises, and would benefit from continued PT      Plan: Continue per plan of care.  Progress treatment as tolerated.  Progress challenge as appropriate with irritability.     Precautions:   Past Medical History:   Diagnosis Date    Patient denies medical problems     Tinnitus of both ears         Insurance:  A/CMS Eval/ Re-eval Auth #/ Referral # Total units or visits Start date  Expiration date KX? Visit limitation?  PT only or  PT+OT? Co-Insurance   CMS 25   12     CMS 3/24/25   4/3/25 7/3/25  12       POC Start Date POC Expiration Date Signed POC?   25 signed   3/13/25 5/11/25 pend      Date 2/19 2/24 2/27 3/3 3/6 3/10 3/13 3/17 3/20 3/24 3/27 3/31 4/7 4/9 4/14 4/17 4/23 4/28 5/7 5/15   Visits/Units:  Used 1 2 3 4 5 6 7 8 9 10 11 12 13 14 15 16 17 18 19 20   Authed:  Remaining                          [employer]:  Access Code: [email or web address]  URL: [email or web address]  Date: 2025  Prepared by: Garrett Lema    Exercises  - Long Sitting Ankle Eversion with Resistance  - 2 x daily - 7 x weekly - 3 sets - 10 reps  - Long Sitting Ankle Plantar Flexion with Resistance  - 2 x daily - 7 x weekly - 3  "sets - 10 reps  - Long Sitting Ankle Inversion with Resistance  - 2 x daily - 7 x weekly - 3 sets - 10 reps  - Long Sitting Ankle Dorsiflexion with Anchored Resistance  - 2 x daily - 7 x weekly - 3 sets - 10 reps  - Long Sitting Calf Stretch with Strap  - 2 x daily - 7 x weekly - 1 sets - 10 reps - 10 hold       5/15      L Ankle PROM BR performed all directions with posterior talar glide 5/7 BR performed all directions with posterior talar glide BR performed all directions with posterior talar glide                         Neuro Re-Ed       Ankle 4-way    HEP   Heel Raises       SLS 2x10 hip ER and IR 2x10 hip ER and IR 2x10 hip ER and IR    SL RDL       Sliders       BOSU Lunge   lateral    Step ups 2x10 5s hold Lunge w/ PF 2x10    Step ups 10x10\" Lunge w/ PF 2x10    Lat Step Downs  L8 w/ star excursion 2x10 L8 w/ star excursion to depth 3x10    Dorsiflexion for Repetitions       Ther Ex       Dorsiflexion Str    HEP   Assessment & Management POC going forward with emphasis on stabilization Reassessment of symptoms  POC with emphasis on early ROM with gradual strengthening and stabilization training.   Gastroc Str       Soleus Str       Leg Press  Calf 4x10 120# Calf press 4x10 120# Calf press 4x10 115#    Step Downs       Lunge PF   2x10 w/ manual OP w/ ER of tibia    SL Squat w/ TRX       Leg Press       Ther Activity                     Gait Training                     Modalities                                                "

## 2025-06-19 ENCOUNTER — TELEPHONE (OUTPATIENT)
Dept: PAIN MEDICINE | Facility: CLINIC | Age: 55
End: 2025-06-19

## 2025-07-03 ENCOUNTER — OFFICE VISIT (OUTPATIENT)
Dept: OBGYN CLINIC | Facility: CLINIC | Age: 55
End: 2025-07-03
Payer: COMMERCIAL

## 2025-07-03 VITALS — WEIGHT: 207 LBS | BODY MASS INDEX: 27.43 KG/M2 | HEIGHT: 73 IN

## 2025-07-03 DIAGNOSIS — Z98.890 S/P ORIF (OPEN REDUCTION INTERNAL FIXATION) FRACTURE: ICD-10-CM

## 2025-07-03 DIAGNOSIS — S82.62XD DISPLACED FRACTURE OF LATERAL MALLEOLUS OF LEFT FIBULA, SUBSEQUENT ENCOUNTER FOR CLOSED FRACTURE WITH ROUTINE HEALING: Primary | ICD-10-CM

## 2025-07-03 DIAGNOSIS — Z87.81 S/P ORIF (OPEN REDUCTION INTERNAL FIXATION) FRACTURE: ICD-10-CM

## 2025-07-03 PROCEDURE — 99213 OFFICE O/P EST LOW 20 MIN: CPT | Performed by: ORTHOPAEDIC SURGERY

## 2025-07-03 NOTE — ASSESSMENT & PLAN NOTE
He is doing well postoperatively. He may resume activities as tolerated. He may return to work without limitations or restrictions on 7/7/2025. A note was provided for this. He should continue working on strength and flexibility of the ankle, as he may continue to see improvements for an additional 6 months. He will follow-up on an as needed basis moving forward.

## 2025-07-03 NOTE — PROGRESS NOTES
"Patient Name: Levi Jin      : 1970       MRN: [id number]   Encounter Provider: Sanjay Jackson MD   Encounter Date: 25  Encounter department: [facility]         Assessment & Plan  Displaced fracture of lateral malleolus of left fibula, subsequent encounter for closed fracture with routine healing  S/P ORIF (open reduction internal fixation) fracture  He is doing well postoperatively. He may resume activities as tolerated. He may return to work without limitations or restrictions on 2025. A note was provided for this. He should continue working on strength and flexibility of the ankle, as he may continue to see improvements for an additional 6 months. He will follow-up on an as needed basis moving forward.          _____________________________________________________  CHIEF COMPLAINT:  Chief Complaint   Patient presents with    Left Ankle - Post-op         SUBJECTIVE:  Levi Jin is a 54 y.o. male who presents 6 months s/p Left lateral maleolus open reduction internal fixation - Left on 1/3/2025. He denies experiencing pain about the left ankle. He has been able to do most tasks at work without issue. He feels he is still lacking few degrees in both dorsiflexion and plantarflexion. His goal is to be able to ski this winter.     PAST MEDICAL HISTORY:  Past Medical History[1]    PAST SURGICAL HISTORY:  Past Surgical History[2]    FAMILY HISTORY:  Family History[3]    SOCIAL HISTORY:  Social History[4]    MEDICATIONS:  Current Medications[5]    ALLERGIES:  Allergies[6]    LABS:  HgA1c: No results found for: \"HGBA1C\"  BMP:   Lab Results   Component Value Date    CALCIUM 9.5 2024    K 4.0 2024    CO2 29 2024     2024    BUN 18 2024    CREATININE 0.97 2024     CBC: No components found for: \"CBC\"    _____________________________________________________  Review of systems: ROS is negative other than that noted in " the HPI.  Constitutional: Negative for fatigue and fever.   HENT: Negative for sore throat.    Respiratory: Negative for shortness of breath.    Cardiovascular: Negative for chest pain.   Gastrointestinal: Negative for abdominal pain.   Endocrine: Negative for cold intolerance and heat intolerance.   Genitourinary: Negative for flank pain.   Musculoskeletal: Negative for back pain.   Skin: Negative for rash.   Allergic/Immunologic: Negative for immunocompromised state.   Neurological: Negative for dizziness.   Psychiatric/Behavioral: Negative for agitation.     Ankle Examination (focused):     No Wounds  Swelling: none  ROM: normal  Strength:    Dorsiflexion:5/5   Plantarflexion: 5/5   Peroneals: 5/5   Tibialis Posterior: 5/5    Gait: normal    No subluxation of the peroneal tendons or tenderness to palpation along the peroneal tendons     No pain with palpation or range of motion of midfoot and forefoot bilaterally    No calf tenderness to palpation bilaterally    LE NV Exam: +2 DP/PT pulses bilaterally  Sensation intact to light touch L2-S1 bilaterally     Physical exam:  General/Constitutional: NAD, well developed, well nourished  HENT: Normocephalic, atraumatic  CV: Intact distal pulses, regular rate  Resp: No respiratory distress or labored breathing  Abdomen: soft, nondistended   Lymphatic: No lymphadenopathy palpated  Neuro: Alert and Oriented x 3, no focal deficits  Psych: Normal mood, normal affect  Skin: Warm, dry, no rashes, no erythema  _____________________________________________________  STUDIES REVIEWED:  No new studies to review.      PROCEDURES PERFORMED:  No procedures performed today.    Scribe Attestation      I,:  Salma Lenó am acting as a scribe while in the presence of the attending physician.:       I,:  Sanjay Jackson MD personally performed the services described in this documentation    as scribed in my presence.:                    [1]   Past Medical History:  Diagnosis Date     Patient denies medical problems     Tinnitus of both ears    [2]   Past Surgical History:  Procedure Laterality Date    NO PAST SURGERIES      OH OPEN TX DISTAL FIBULAR FRACTURE LAT MALLEOLUS Left 1/3/2025    Procedure: Left lateral maleolus open reduction internal fixation;  Surgeon: Sanjay Jackson MD;  Location: [facility];  Service: Orthopedics   [3]   Family History  Problem Relation Name Age of Onset    Diabetes Mother      Cancer Father      Heart disease Father     [4]   Social History  Tobacco Use    Smoking status: Never    Smokeless tobacco: Never   Vaping Use    Vaping status: Never Used   Substance Use Topics    Alcohol use: Yes    Drug use: Never   [5]   Current Outpatient Medications:     Multiple Vitamin (multivitamin) tablet, Take 1 tablet by mouth in the morning., Disp: , Rfl:     acetaminophen (TYLENOL) 500 mg tablet, Take 2 tablets (1,000 mg total) by mouth every 8 (eight) hours, Disp: 60 tablet, Rfl: 0    aspirin 81 mg chewable tablet, Chew 1 tablet (81 mg total) 2 (two) times a day, Disp: 84 tablet, Rfl: 0    ibuprofen (MOTRIN) 200 mg tablet, Take 400 mg by mouth every 6 (six) hours as needed for mild pain, Disp: , Rfl:     naproxen (Naprosyn) 500 mg tablet, Take 1 tablet (500 mg total) by mouth 2 (two) times a day with meals, Disp: 20 tablet, Rfl: 0    oxyCODONE (Roxicodone) 5 immediate release tablet, Take 1 tablet (5 mg total) by mouth every 6 (six) hours as needed for severe pain for up to 10 doses Max Daily Amount: 20 mg, Disp: 10 tablet, Rfl: 0  [6] No Known Allergies

## 2025-07-03 NOTE — LETTER
July 3, 2025     Patient: Levi Jin  YOB: 1970  Date of Visit: 7/3/2025      To Whom it May Concern:    Levi Jin is under my professional care. Levi was seen in my office on 7/3/2025. Levi may return to work on 7/7/2025 without limitations or restrictions.    If you have any questions or concerns, please don't hesitate to call.         Sincerely,          Sanjay Jackson MD        CC: No Recipients

## (undated) DEVICE — BETHLEHEM UNIVERSAL  MIONR EXT: Brand: CARDINAL HEALTH

## (undated) DEVICE — SPONGE GAUZE 4 X 8 12 PLY STRL LF

## (undated) DEVICE — DRAPE C-ARMOUR

## (undated) DEVICE — TIBURON EXTREMITY SHEET: Brand: CONVERTORS

## (undated) DEVICE — ANTIBACTERIAL VIOLET BRAIDED (POLYGLACTIN 910), SYNTHETIC ABSORBABLE SUTURE: Brand: COATED VICRYL

## (undated) DEVICE — GLOVE SRG BIOGEL 8.5

## (undated) DEVICE — DRAPE C ARM STRL 30 X 30

## (undated) DEVICE — CURITY NON-ADHERENT STRIPS: Brand: CURITY

## (undated) DEVICE — DRILL BIT 3MM

## (undated) DEVICE — STRETCH BANDAGE: Brand: CURITY

## (undated) DEVICE — ABDOMINAL PAD: Brand: DERMACEA

## (undated) DEVICE — PREP SURGICAL PURPREP 26ML

## (undated) DEVICE — INTENDED FOR TISSUE SEPARATION, AND OTHER PROCEDURES THAT REQUIRE A SHARP SURGICAL BLADE TO PUNCTURE OR CUT.: Brand: BARD-PARKER ® CARBON RIB-BACK BLADES

## (undated) DEVICE — SPONGE LAP 18 X 18 IN STRL RFD

## (undated) DEVICE — ARTHREX DRILL BIT 2.5MM

## (undated) DEVICE — NEPTUNE E-SEP SMOKE EVACUATION PENCIL, COATED, 70MM BLADE, PUSH BUTTON SWITCH: Brand: NEPTUNE E-SEP

## (undated) DEVICE — GLOVE INDICATOR PI UNDERGLOVE SZ 8 BLUE

## (undated) DEVICE — GLOVE SRG BIOGEL 8

## (undated) DEVICE — GARMENT,MEDLINE,DVT,INT,CALF,FOAM,MED: Brand: MEDLINE

## (undated) DEVICE — CAST PADDING 4 IN SYNTHETIC NON-STRL

## (undated) DEVICE — STOCKINETTE,IMPERVIOUS,12X48,STERILE: Brand: MEDLINE

## (undated) DEVICE — ANTIBACTERIAL UNDYED BRAIDED (POLYGLACTIN 910), SYNTHETIC ABSORBABLE SUTURE: Brand: COATED VICRYL

## (undated) DEVICE — SUT ETHILON 2-0 FS 18 IN 664H

## (undated) DEVICE — 3M™ COBAN™ NL STERILE NON-LATEX SELF-ADHERENT WRAP, 2084S, 4 IN X 5 YD (10 CM X 4,5 M), 18 ROLLS/CASE: Brand: 3M™ COBAN™

## (undated) DEVICE — ASTOUND FABRIC REINFORCED SURGICAL GOWN: Brand: CONVERTORS

## (undated) DEVICE — ACE WRAP 6 IN UNSTERILE

## (undated) DEVICE — CHLORAPREP HI-LITE 26ML ORANGE

## (undated) DEVICE — DRILL BIT 2 MM ANKLE CALIB GRAD ARTHREX

## (undated) DEVICE — 3M™ STERI-DRAPE™ U-DRAPE 1015: Brand: STERI-DRAPE™

## (undated) DEVICE — PAD CAST 4 IN COTTON NON STERILE